# Patient Record
Sex: FEMALE | Race: WHITE | NOT HISPANIC OR LATINO | Employment: STUDENT | URBAN - METROPOLITAN AREA
[De-identification: names, ages, dates, MRNs, and addresses within clinical notes are randomized per-mention and may not be internally consistent; named-entity substitution may affect disease eponyms.]

---

## 2018-05-29 ENCOUNTER — TELEPHONE (OUTPATIENT)
Dept: OBSTETRICS AND GYNECOLOGY | Facility: CLINIC | Age: 23
End: 2018-05-29

## 2018-05-29 NOTE — TELEPHONE ENCOUNTER
I spoke with MA to see if when she called patient if she had complaints of decreased fetal movement, contractions, vaginal bleeding leakage of fluid. I was informed that the patient was fine and was scheduled to see Dr. Muñoz next week.     Khushi Vick MD, FACOG  OB/GYN  Pager: [phone]

## 2018-05-29 NOTE — TELEPHONE ENCOUNTER
----- Message from Maxine Heart sent at 5/29/2018  1:42 PM CDT -----  Contact: Self  [phone]  Patient is 7 month pregnant and she feels like she needs to be seen today due to she has not seen any doctor or had medical treatments. Please advice

## 2018-06-04 ENCOUNTER — PROCEDURE VISIT (OUTPATIENT)
Dept: OBSTETRICS AND GYNECOLOGY | Facility: CLINIC | Age: 23
End: 2018-06-04
Payer: MEDICAID

## 2018-06-04 ENCOUNTER — LAB VISIT (OUTPATIENT)
Dept: LAB | Facility: HOSPITAL | Age: 23
End: 2018-06-04
Payer: MEDICAID

## 2018-06-04 ENCOUNTER — OFFICE VISIT (OUTPATIENT)
Dept: OBSTETRICS AND GYNECOLOGY | Facility: CLINIC | Age: 23
End: 2018-06-04
Payer: MEDICAID

## 2018-06-04 VITALS
DIASTOLIC BLOOD PRESSURE: 70 MMHG | WEIGHT: 253.5 LBS | SYSTOLIC BLOOD PRESSURE: 130 MMHG | HEIGHT: 65 IN | BODY MASS INDEX: 42.24 KG/M2

## 2018-06-04 DIAGNOSIS — Z34.83 PRENATAL CARE, SUBSEQUENT PREGNANCY IN THIRD TRIMESTER: ICD-10-CM

## 2018-06-04 DIAGNOSIS — Z98.891 HISTORY OF C-SECTION: ICD-10-CM

## 2018-06-04 DIAGNOSIS — O09.33 NO PRENATAL CARE IN CURRENT PREGNANCY, THIRD TRIMESTER: ICD-10-CM

## 2018-06-04 DIAGNOSIS — Z34.83 PRENATAL CARE, SUBSEQUENT PREGNANCY IN THIRD TRIMESTER: Primary | ICD-10-CM

## 2018-06-04 DIAGNOSIS — O09.30 INSUFFICIENT PRENATAL CARE, UNSPECIFIED TRIMESTER: Primary | ICD-10-CM

## 2018-06-04 DIAGNOSIS — Z36.3 ANTENATAL SCREENING FOR MALFORMATION USING ULTRASONICS: ICD-10-CM

## 2018-06-04 LAB
ABO + RH BLD: NORMAL
BASOPHILS # BLD AUTO: 0.01 K/UL
BASOPHILS NFR BLD: 0.1 %
BLD GP AB SCN CELLS X3 SERPL QL: NORMAL
DIFFERENTIAL METHOD: ABNORMAL
EOSINOPHIL # BLD AUTO: 0.1 K/UL
EOSINOPHIL NFR BLD: 0.6 %
ERYTHROCYTE [DISTWIDTH] IN BLOOD BY AUTOMATED COUNT: 13.2 %
HCT VFR BLD AUTO: 38.1 %
HGB BLD-MCNC: 13.1 G/DL
LYMPHOCYTES # BLD AUTO: 2.3 K/UL
LYMPHOCYTES NFR BLD: 18.7 %
MCH RBC QN AUTO: 30 PG
MCHC RBC AUTO-ENTMCNC: 34.4 G/DL
MCV RBC AUTO: 87 FL
MONOCYTES # BLD AUTO: 0.6 K/UL
MONOCYTES NFR BLD: 4.9 %
NEUTROPHILS # BLD AUTO: 9.2 K/UL
NEUTROPHILS NFR BLD: 75.7 %
PLATELET # BLD AUTO: 324 K/UL
PMV BLD AUTO: 11.1 FL
RBC # BLD AUTO: 4.36 M/UL
WBC # BLD AUTO: 12.13 K/UL

## 2018-06-04 PROCEDURE — 86703 HIV-1/HIV-2 1 RESULT ANTBDY: CPT

## 2018-06-04 PROCEDURE — 36415 COLL VENOUS BLD VENIPUNCTURE: CPT

## 2018-06-04 PROCEDURE — 76819 FETAL BIOPHYS PROFIL W/O NST: CPT | Mod: PBBFAC,PO

## 2018-06-04 PROCEDURE — 87340 HEPATITIS B SURFACE AG IA: CPT

## 2018-06-04 PROCEDURE — 76805 OB US >/= 14 WKS SNGL FETUS: CPT | Mod: PBBFAC,PO

## 2018-06-04 PROCEDURE — 86762 RUBELLA ANTIBODY: CPT

## 2018-06-04 PROCEDURE — 86850 RBC ANTIBODY SCREEN: CPT

## 2018-06-04 PROCEDURE — 76805 OB US >/= 14 WKS SNGL FETUS: CPT | Mod: 26,S$PBB,, | Performed by: OBSTETRICS & GYNECOLOGY

## 2018-06-04 PROCEDURE — 99999 PR PBB SHADOW E&M-EST. PATIENT-LVL III: CPT | Mod: PBBFAC,,, | Performed by: OBSTETRICS & GYNECOLOGY

## 2018-06-04 PROCEDURE — 99213 OFFICE O/P EST LOW 20 MIN: CPT | Mod: PBBFAC,TH,PO | Performed by: OBSTETRICS & GYNECOLOGY

## 2018-06-04 PROCEDURE — 86592 SYPHILIS TEST NON-TREP QUAL: CPT

## 2018-06-04 PROCEDURE — 76819 FETAL BIOPHYS PROFIL W/O NST: CPT | Mod: 26,S$PBB,, | Performed by: OBSTETRICS & GYNECOLOGY

## 2018-06-04 PROCEDURE — 99203 OFFICE O/P NEW LOW 30 MIN: CPT | Mod: S$PBB,25,, | Performed by: OBSTETRICS & GYNECOLOGY

## 2018-06-04 PROCEDURE — 85025 COMPLETE CBC W/AUTO DIFF WBC: CPT

## 2018-06-04 NOTE — PROGRESS NOTES
Patient presents today with intrauterine pregnancy well advanced.  Her last menstrual period by history of 2017 and she states she had a positive pregnancy tests at home and 2018 by those dates she is due on 2018 and she is 37 weeks gestation.  Patient is a  3 para 2 with 2  sections in the past.  She has had no prenatal care during this pregnancy.  Ultrasound is scheduled and labs are scheduled as well.  Patient is given assistance to try to facilitate her Medicaid card as soon as possible.  Patient has not had any history of serious problems during the course of this unsupervised pregnancy.  Examination shows a fundal height of 37 cm.  Fetal heart tones are 120 in the right lower quadrant.  Pelvic exam shows a high presenting part with a closed long cervix.  It cannot be determined with certainty if this is a vertex presentation are not.  Consent forms were also obtained and patient was given labor precautions.  Patient was last seen in this office in .    U/S has  BPP but EDC of 2018 (probably erroneous due to HC and BPD measurements; OFD is in sync with dates and AC and FL.   Will repeat in 3 weeks. VTX Female.

## 2018-06-05 LAB
HBV SURFACE AG SERPL QL IA: NEGATIVE
HIV 1+2 AB+HIV1 P24 AG SERPL QL IA: NEGATIVE
RPR SER QL: NORMAL
RUBV IGG SER-ACNC: 22.2 IU/ML
RUBV IGG SER-IMP: REACTIVE

## 2018-06-11 ENCOUNTER — ROUTINE PRENATAL (OUTPATIENT)
Dept: OBSTETRICS AND GYNECOLOGY | Facility: CLINIC | Age: 23
End: 2018-06-11
Payer: MEDICAID

## 2018-06-11 VITALS — BODY MASS INDEX: 43.4 KG/M2 | WEIGHT: 260.81 LBS | DIASTOLIC BLOOD PRESSURE: 62 MMHG | SYSTOLIC BLOOD PRESSURE: 130 MMHG

## 2018-06-11 DIAGNOSIS — O09.33 NO PRENATAL CARE IN CURRENT PREGNANCY, THIRD TRIMESTER: Primary | ICD-10-CM

## 2018-06-11 DIAGNOSIS — Z98.891 HISTORY OF C-SECTION: ICD-10-CM

## 2018-06-11 DIAGNOSIS — Z34.83 PRENATAL CARE, SUBSEQUENT PREGNANCY IN THIRD TRIMESTER: ICD-10-CM

## 2018-06-11 PROCEDURE — 99999 PR PBB SHADOW E&M-EST. PATIENT-LVL III: CPT | Mod: PBBFAC,,, | Performed by: OBSTETRICS & GYNECOLOGY

## 2018-06-11 PROCEDURE — 99213 OFFICE O/P EST LOW 20 MIN: CPT | Mod: PBBFAC,TH,PO | Performed by: OBSTETRICS & GYNECOLOGY

## 2018-06-11 PROCEDURE — 99213 OFFICE O/P EST LOW 20 MIN: CPT | Mod: S$PBB,TH,, | Performed by: OBSTETRICS & GYNECOLOGY

## 2018-06-11 NOTE — PROGRESS NOTES
High VTX (?)---edc by single scan 1 week ago is 8/2/2018. Labs OK---no result for HgbA1c  RTO 2 weeks; Rpt u/s next visit---recheck measurements violetta splay for BPD-HC-OF

## 2018-06-25 ENCOUNTER — PROCEDURE VISIT (OUTPATIENT)
Dept: OBSTETRICS AND GYNECOLOGY | Facility: CLINIC | Age: 23
End: 2018-06-25
Payer: MEDICAID

## 2018-06-25 ENCOUNTER — ROUTINE PRENATAL (OUTPATIENT)
Dept: OBSTETRICS AND GYNECOLOGY | Facility: CLINIC | Age: 23
End: 2018-06-25
Payer: MEDICAID

## 2018-06-25 VITALS
SYSTOLIC BLOOD PRESSURE: 128 MMHG | DIASTOLIC BLOOD PRESSURE: 70 MMHG | WEIGHT: 257.25 LBS | BODY MASS INDEX: 42.81 KG/M2

## 2018-06-25 DIAGNOSIS — Z98.891 HISTORY OF C-SECTION: ICD-10-CM

## 2018-06-25 DIAGNOSIS — Z34.83 PRENATAL CARE, SUBSEQUENT PREGNANCY IN THIRD TRIMESTER: Primary | ICD-10-CM

## 2018-06-25 DIAGNOSIS — O36.60X0: ICD-10-CM

## 2018-06-25 DIAGNOSIS — Z34.83 PRENATAL CARE, SUBSEQUENT PREGNANCY IN THIRD TRIMESTER: ICD-10-CM

## 2018-06-25 DIAGNOSIS — O99.213 MATERNAL OBESITY, ANTEPARTUM, THIRD TRIMESTER: Primary | ICD-10-CM

## 2018-06-25 DIAGNOSIS — O09.33 NO PRENATAL CARE IN CURRENT PREGNANCY, THIRD TRIMESTER: ICD-10-CM

## 2018-06-25 DIAGNOSIS — O26.849 UTERINE SIZE DATE DISCREPANCY, ANTEPARTUM, UNSPECIFIED TRIMESTER: ICD-10-CM

## 2018-06-25 DIAGNOSIS — O09.33 INSUFFICIENT PRENATAL CARE IN THIRD TRIMESTER: ICD-10-CM

## 2018-06-25 PROCEDURE — 76819 FETAL BIOPHYS PROFIL W/O NST: CPT | Mod: PBBFAC,PO

## 2018-06-25 PROCEDURE — 99213 OFFICE O/P EST LOW 20 MIN: CPT | Mod: S$PBB,TH,25, | Performed by: OBSTETRICS & GYNECOLOGY

## 2018-06-25 PROCEDURE — 76816 OB US FOLLOW-UP PER FETUS: CPT | Mod: 26,S$PBB,, | Performed by: OBSTETRICS & GYNECOLOGY

## 2018-06-25 PROCEDURE — 99212 OFFICE O/P EST SF 10 MIN: CPT | Mod: PBBFAC,TH,PO | Performed by: OBSTETRICS & GYNECOLOGY

## 2018-06-25 PROCEDURE — 99999 PR PBB SHADOW E&M-EST. PATIENT-LVL II: CPT | Mod: PBBFAC,,, | Performed by: OBSTETRICS & GYNECOLOGY

## 2018-06-25 PROCEDURE — 76816 OB US FOLLOW-UP PER FETUS: CPT | Mod: PBBFAC,PO

## 2018-06-25 PROCEDURE — 76819 FETAL BIOPHYS PROFIL W/O NST: CPT | Mod: 26,S$PBB,, | Performed by: OBSTETRICS & GYNECOLOGY

## 2018-06-25 NOTE — PROGRESS NOTES
U/S: VTX Female; EDC 8/4/2018---Rpt C/S scheduled for 7/30/2018.  Labs reviewed---all OK; did not draw HgbA1c.   Fundus deviates to LUQ---poss fibroid?  RTO 2 weeks then weekly until delivery

## 2018-07-05 ENCOUNTER — ANESTHESIA EVENT (OUTPATIENT)
Dept: OBSTETRICS AND GYNECOLOGY | Facility: HOSPITAL | Age: 23
End: 2018-07-05
Payer: MEDICAID

## 2018-07-05 ENCOUNTER — TELEPHONE (OUTPATIENT)
Dept: OBSTETRICS AND GYNECOLOGY | Facility: CLINIC | Age: 23
End: 2018-07-05

## 2018-07-05 ENCOUNTER — HOSPITAL ENCOUNTER (INPATIENT)
Facility: HOSPITAL | Age: 23
LOS: 2 days | Discharge: HOME | End: 2018-07-07
Attending: OBSTETRICS & GYNECOLOGY | Admitting: OBSTETRICS & GYNECOLOGY
Payer: MEDICAID

## 2018-07-05 ENCOUNTER — ANESTHESIA (OUTPATIENT)
Dept: OBSTETRICS AND GYNECOLOGY | Facility: HOSPITAL | Age: 23
End: 2018-07-05
Payer: MEDICAID

## 2018-07-05 DIAGNOSIS — O60.03 PRETERM LABOR IN THIRD TRIMESTER: ICD-10-CM

## 2018-07-05 DIAGNOSIS — Z36.89 ENCOUNTER FOR TRIAGE IN PREGNANT PATIENT: ICD-10-CM

## 2018-07-05 LAB
ABO + RH BLD: NORMAL
BASOPHILS # BLD AUTO: 0.01 K/UL
BASOPHILS NFR BLD: 0.1 %
BLD GP AB SCN CELLS X3 SERPL QL: NORMAL
DIFFERENTIAL METHOD: ABNORMAL
EOSINOPHIL # BLD AUTO: 0 K/UL
EOSINOPHIL NFR BLD: 0 %
ERYTHROCYTE [DISTWIDTH] IN BLOOD BY AUTOMATED COUNT: 13.1 %
HCT VFR BLD AUTO: 36.3 %
HGB BLD-MCNC: 12.3 G/DL
LYMPHOCYTES # BLD AUTO: 1.3 K/UL
LYMPHOCYTES NFR BLD: 6.9 %
MCH RBC QN AUTO: 29.1 PG
MCHC RBC AUTO-ENTMCNC: 33.9 G/DL
MCV RBC AUTO: 86 FL
MONOCYTES # BLD AUTO: 0.6 K/UL
MONOCYTES NFR BLD: 3.4 %
NEUTROPHILS # BLD AUTO: 16.2 K/UL
NEUTROPHILS NFR BLD: 89.3 %
PLATELET # BLD AUTO: 248 K/UL
PMV BLD AUTO: 10.7 FL
RBC # BLD AUTO: 4.22 M/UL
WBC # BLD AUTO: 18.12 K/UL

## 2018-07-05 PROCEDURE — 37000008 HC ANESTHESIA 1ST 15 MINUTES: Performed by: OBSTETRICS & GYNECOLOGY

## 2018-07-05 PROCEDURE — 59514 CESAREAN DELIVERY ONLY: CPT | Mod: AT,,, | Performed by: OBSTETRICS & GYNECOLOGY

## 2018-07-05 PROCEDURE — 37000009 HC ANESTHESIA EA ADD 15 MINS: Performed by: OBSTETRICS & GYNECOLOGY

## 2018-07-05 PROCEDURE — 25000003 PHARM REV CODE 250: Performed by: OBSTETRICS & GYNECOLOGY

## 2018-07-05 PROCEDURE — 63600175 PHARM REV CODE 636 W HCPCS: Performed by: ANESTHESIOLOGY

## 2018-07-05 PROCEDURE — 86850 RBC ANTIBODY SCREEN: CPT

## 2018-07-05 PROCEDURE — 36415 COLL VENOUS BLD VENIPUNCTURE: CPT

## 2018-07-05 PROCEDURE — 25000003 PHARM REV CODE 250: Performed by: ANESTHESIOLOGY

## 2018-07-05 PROCEDURE — 27201108 HC SURGICEL

## 2018-07-05 PROCEDURE — S0028 INJECTION, FAMOTIDINE, 20 MG: HCPCS | Performed by: ANESTHESIOLOGY

## 2018-07-05 PROCEDURE — 36000681 HC C/S TUBAL LIGATION LEVEL II

## 2018-07-05 PROCEDURE — 27800516 HC TRAY, EPIDURAL COMBO: Performed by: ANESTHESIOLOGY

## 2018-07-05 PROCEDURE — 51702 INSERT TEMP BLADDER CATH: CPT

## 2018-07-05 PROCEDURE — 99211 OFF/OP EST MAY X REQ PHY/QHP: CPT | Mod: TH,25

## 2018-07-05 PROCEDURE — 63600175 PHARM REV CODE 636 W HCPCS: Performed by: OBSTETRICS & GYNECOLOGY

## 2018-07-05 PROCEDURE — 85025 COMPLETE CBC W/AUTO DIFF WBC: CPT

## 2018-07-05 PROCEDURE — 72100002 HC LABOR CARE, 1ST 8 HOURS

## 2018-07-05 PROCEDURE — 11000001 HC ACUTE MED/SURG PRIVATE ROOM

## 2018-07-05 PROCEDURE — 36000684 HC CESAREAN SECTION, UNSCHEDULED

## 2018-07-05 PROCEDURE — 27200710 HC EPIDURAL INFUSION PUMP SET: Performed by: ANESTHESIOLOGY

## 2018-07-05 RX ORDER — ONDANSETRON 2 MG/ML
4 INJECTION INTRAMUSCULAR; INTRAVENOUS EVERY 12 HOURS PRN
Status: DISCONTINUED | OUTPATIENT
Start: 2018-07-05 | End: 2018-07-05

## 2018-07-05 RX ORDER — KETOROLAC TROMETHAMINE 30 MG/ML
INJECTION, SOLUTION INTRAMUSCULAR; INTRAVENOUS
Status: DISCONTINUED | OUTPATIENT
Start: 2018-07-05 | End: 2018-07-05

## 2018-07-05 RX ORDER — HYDROCORTISONE 25 MG/G
CREAM TOPICAL 3 TIMES DAILY PRN
Status: DISCONTINUED | OUTPATIENT
Start: 2018-07-05 | End: 2018-07-05

## 2018-07-05 RX ORDER — OXYCODONE AND ACETAMINOPHEN 5; 325 MG/1; MG/1
1 TABLET ORAL EVERY 4 HOURS PRN
Status: DISCONTINUED | OUTPATIENT
Start: 2018-07-05 | End: 2018-07-07 | Stop reason: HOSPADM

## 2018-07-05 RX ORDER — SODIUM CHLORIDE, SODIUM LACTATE, POTASSIUM CHLORIDE, CALCIUM CHLORIDE 600; 310; 30; 20 MG/100ML; MG/100ML; MG/100ML; MG/100ML
INJECTION, SOLUTION INTRAVENOUS CONTINUOUS
Status: ACTIVE | OUTPATIENT
Start: 2018-07-05 | End: 2018-07-05

## 2018-07-05 RX ORDER — ACETAMINOPHEN 10 MG/ML
1000 INJECTION, SOLUTION INTRAVENOUS ONCE
Status: COMPLETED | OUTPATIENT
Start: 2018-07-05 | End: 2018-07-05

## 2018-07-05 RX ORDER — OXYTOCIN/RINGER'S LACTATE 20/1000 ML
41.65 PLASTIC BAG, INJECTION (ML) INTRAVENOUS CONTINUOUS
Status: ACTIVE | OUTPATIENT
Start: 2018-07-05 | End: 2018-07-05

## 2018-07-05 RX ORDER — DOCUSATE SODIUM 100 MG/1
200 CAPSULE, LIQUID FILLED ORAL 2 TIMES DAILY
Status: DISCONTINUED | OUTPATIENT
Start: 2018-07-05 | End: 2018-07-07 | Stop reason: HOSPADM

## 2018-07-05 RX ORDER — TERBUTALINE SULFATE 1 MG/ML
0.25 INJECTION SUBCUTANEOUS ONCE
Status: COMPLETED | OUTPATIENT
Start: 2018-07-05 | End: 2018-07-05

## 2018-07-05 RX ORDER — FAMOTIDINE 10 MG/ML
20 INJECTION INTRAVENOUS
Status: COMPLETED | OUTPATIENT
Start: 2018-07-05 | End: 2018-07-05

## 2018-07-05 RX ORDER — MUPIROCIN 20 MG/G
1 OINTMENT TOPICAL 2 TIMES DAILY
Status: DISCONTINUED | OUTPATIENT
Start: 2018-07-05 | End: 2018-07-07 | Stop reason: HOSPADM

## 2018-07-05 RX ORDER — SODIUM CHLORIDE, SODIUM LACTATE, POTASSIUM CHLORIDE, CALCIUM CHLORIDE 600; 310; 30; 20 MG/100ML; MG/100ML; MG/100ML; MG/100ML
INJECTION, SOLUTION INTRAVENOUS CONTINUOUS PRN
Status: DISCONTINUED | OUTPATIENT
Start: 2018-07-05 | End: 2018-07-05

## 2018-07-05 RX ORDER — PHENYLEPHRINE HYDROCHLORIDE 10 MG/ML
INJECTION INTRAVENOUS
Status: DISCONTINUED | OUTPATIENT
Start: 2018-07-05 | End: 2018-07-05

## 2018-07-05 RX ORDER — OXYCODONE AND ACETAMINOPHEN 10; 325 MG/1; MG/1
1 TABLET ORAL EVERY 4 HOURS PRN
Status: DISCONTINUED | OUTPATIENT
Start: 2018-07-05 | End: 2018-07-07 | Stop reason: HOSPADM

## 2018-07-05 RX ORDER — BETAMETHASONE SODIUM PHOSPHATE AND BETAMETHASONE ACETATE 3; 3 MG/ML; MG/ML
12 INJECTION, SUSPENSION INTRA-ARTICULAR; INTRALESIONAL; INTRAMUSCULAR; SOFT TISSUE ONCE
Status: COMPLETED | OUTPATIENT
Start: 2018-07-05 | End: 2018-07-05

## 2018-07-05 RX ORDER — KETOROLAC TROMETHAMINE 30 MG/ML
30 INJECTION, SOLUTION INTRAMUSCULAR; INTRAVENOUS ONCE
Status: DISCONTINUED | OUTPATIENT
Start: 2018-07-05 | End: 2018-07-07 | Stop reason: HOSPADM

## 2018-07-05 RX ORDER — KETOROLAC TROMETHAMINE 30 MG/ML
30 INJECTION, SOLUTION INTRAMUSCULAR; INTRAVENOUS EVERY 6 HOURS
Status: DISPENSED | OUTPATIENT
Start: 2018-07-05 | End: 2018-07-06

## 2018-07-05 RX ORDER — BETAMETHASONE SODIUM PHOSPHATE AND BETAMETHASONE ACETATE 3; 3 MG/ML; MG/ML
6 INJECTION, SUSPENSION INTRA-ARTICULAR; INTRALESIONAL; INTRAMUSCULAR; SOFT TISSUE ONCE
Status: DISCONTINUED | OUTPATIENT
Start: 2018-07-05 | End: 2018-07-05

## 2018-07-05 RX ORDER — SODIUM CHLORIDE, SODIUM LACTATE, POTASSIUM CHLORIDE, CALCIUM CHLORIDE 600; 310; 30; 20 MG/100ML; MG/100ML; MG/100ML; MG/100ML
INJECTION, SOLUTION INTRAVENOUS CONTINUOUS
Status: DISCONTINUED | OUTPATIENT
Start: 2018-07-05 | End: 2018-07-05

## 2018-07-05 RX ORDER — SODIUM CHLORIDE, SODIUM LACTATE, POTASSIUM CHLORIDE, CALCIUM CHLORIDE 600; 310; 30; 20 MG/100ML; MG/100ML; MG/100ML; MG/100ML
INJECTION, SOLUTION INTRAVENOUS
Status: DISCONTINUED | OUTPATIENT
Start: 2018-07-05 | End: 2018-07-05

## 2018-07-05 RX ORDER — ADHESIVE BANDAGE
30 BANDAGE TOPICAL 2 TIMES DAILY PRN
Status: DISCONTINUED | OUTPATIENT
Start: 2018-07-06 | End: 2018-07-07 | Stop reason: HOSPADM

## 2018-07-05 RX ORDER — IBUPROFEN 400 MG/1
800 TABLET ORAL EVERY 8 HOURS
Status: DISCONTINUED | OUTPATIENT
Start: 2018-07-06 | End: 2018-07-07 | Stop reason: HOSPADM

## 2018-07-05 RX ORDER — TERBUTALINE SULFATE 1 MG/ML
0.25 INJECTION SUBCUTANEOUS
Status: DISCONTINUED | OUTPATIENT
Start: 2018-07-05 | End: 2018-07-05

## 2018-07-05 RX ORDER — MUPIROCIN 20 MG/G
OINTMENT TOPICAL
Status: DISCONTINUED | OUTPATIENT
Start: 2018-07-05 | End: 2018-07-05

## 2018-07-05 RX ORDER — ONDANSETRON 8 MG/1
8 TABLET, ORALLY DISINTEGRATING ORAL EVERY 8 HOURS PRN
Status: DISCONTINUED | OUTPATIENT
Start: 2018-07-05 | End: 2018-07-07 | Stop reason: HOSPADM

## 2018-07-05 RX ORDER — AMOXICILLIN 250 MG
1 CAPSULE ORAL NIGHTLY PRN
Status: DISCONTINUED | OUTPATIENT
Start: 2018-07-05 | End: 2018-07-07 | Stop reason: HOSPADM

## 2018-07-05 RX ORDER — DIPHENHYDRAMINE HYDROCHLORIDE 50 MG/ML
12.5 INJECTION INTRAMUSCULAR; INTRAVENOUS
Status: DISCONTINUED | OUTPATIENT
Start: 2018-07-05 | End: 2018-07-07 | Stop reason: HOSPADM

## 2018-07-05 RX ORDER — ACETAMINOPHEN 500 MG
1000 TABLET ORAL ONCE
Status: DISCONTINUED | OUTPATIENT
Start: 2018-07-05 | End: 2018-07-07 | Stop reason: HOSPADM

## 2018-07-05 RX ORDER — MISOPROSTOL 200 UG/1
800 TABLET ORAL
Status: DISCONTINUED | OUTPATIENT
Start: 2018-07-05 | End: 2018-07-05

## 2018-07-05 RX ORDER — DIPHENHYDRAMINE HCL 25 MG
25 CAPSULE ORAL EVERY 4 HOURS PRN
Status: DISCONTINUED | OUTPATIENT
Start: 2018-07-05 | End: 2018-07-07 | Stop reason: HOSPADM

## 2018-07-05 RX ORDER — SIMETHICONE 80 MG
1 TABLET,CHEWABLE ORAL EVERY 6 HOURS PRN
Status: DISCONTINUED | OUTPATIENT
Start: 2018-07-05 | End: 2018-07-07 | Stop reason: HOSPADM

## 2018-07-05 RX ORDER — NALBUPHINE HYDROCHLORIDE 20 MG/ML
5 INJECTION, SOLUTION INTRAMUSCULAR; INTRAVENOUS; SUBCUTANEOUS EVERY 10 MIN PRN
Status: DISCONTINUED | OUTPATIENT
Start: 2018-07-05 | End: 2018-07-07 | Stop reason: HOSPADM

## 2018-07-05 RX ORDER — BISACODYL 10 MG
10 SUPPOSITORY, RECTAL RECTAL ONCE AS NEEDED
Status: ACTIVE | OUTPATIENT
Start: 2018-07-05 | End: 2018-07-05

## 2018-07-05 RX ORDER — SODIUM CITRATE AND CITRIC ACID MONOHYDRATE 334; 500 MG/5ML; MG/5ML
30 SOLUTION ORAL
Status: DISCONTINUED | OUTPATIENT
Start: 2018-07-05 | End: 2018-07-05

## 2018-07-05 RX ADMIN — DOCUSATE SODIUM 200 MG: 100 CAPSULE, LIQUID FILLED ORAL at 08:07

## 2018-07-05 RX ADMIN — DEXTROSE 3 G: 50 INJECTION, SOLUTION INTRAVENOUS at 05:07

## 2018-07-05 RX ADMIN — ACETAMINOPHEN 1000 MG: 10 INJECTION, SOLUTION INTRAVENOUS at 08:07

## 2018-07-05 RX ADMIN — PHENYLEPHRINE HYDROCHLORIDE 50 MCG: 10 INJECTION INTRAVENOUS at 05:07

## 2018-07-05 RX ADMIN — SODIUM CHLORIDE, SODIUM LACTATE, POTASSIUM CHLORIDE, AND CALCIUM CHLORIDE: .6; .31; .03; .02 INJECTION, SOLUTION INTRAVENOUS at 11:07

## 2018-07-05 RX ADMIN — TERBUTALINE SULFATE 0.25 MG: 1 INJECTION, SOLUTION SUBCUTANEOUS at 03:07

## 2018-07-05 RX ADMIN — PHENYLEPHRINE HYDROCHLORIDE 100 MCG: 10 INJECTION INTRAVENOUS at 05:07

## 2018-07-05 RX ADMIN — OXYCODONE HYDROCHLORIDE AND ACETAMINOPHEN 1 TABLET: 10; 325 TABLET ORAL at 10:07

## 2018-07-05 RX ADMIN — KETOROLAC TROMETHAMINE 30 MG: 30 INJECTION, SOLUTION INTRAMUSCULAR; INTRAVENOUS at 05:07

## 2018-07-05 RX ADMIN — PROMETHAZINE HYDROCHLORIDE 12.5 MG: 25 INJECTION INTRAMUSCULAR; INTRAVENOUS at 09:07

## 2018-07-05 RX ADMIN — MUPIROCIN 1 G: 20 OINTMENT TOPICAL at 09:07

## 2018-07-05 RX ADMIN — BETAMETHASONE SODIUM PHOSPHATE AND BETAMETHASONE ACETATE 12 MG: 3; 3 INJECTION, SUSPENSION INTRA-ARTICULAR; INTRALESIONAL; INTRAMUSCULAR at 03:07

## 2018-07-05 RX ADMIN — SODIUM CITRATE AND CITRIC ACID MONOHYDRATE 30 ML: 500; 334 SOLUTION ORAL at 05:07

## 2018-07-05 RX ADMIN — SODIUM CHLORIDE, SODIUM LACTATE, POTASSIUM CHLORIDE, AND CALCIUM CHLORIDE: .6; .31; .03; .02 INJECTION, SOLUTION INTRAVENOUS at 05:07

## 2018-07-05 RX ADMIN — FAMOTIDINE 20 MG: 10 INJECTION INTRAVENOUS at 05:07

## 2018-07-05 RX ADMIN — OXYCODONE HYDROCHLORIDE AND ACETAMINOPHEN 1 TABLET: 10; 325 TABLET ORAL at 06:07

## 2018-07-05 RX ADMIN — TERBUTALINE SULFATE 0.25 MG: 1 INJECTION, SOLUTION SUBCUTANEOUS at 04:07

## 2018-07-05 RX ADMIN — SODIUM CHLORIDE, SODIUM LACTATE, POTASSIUM CHLORIDE, AND CALCIUM CHLORIDE 1000 ML: .6; .31; .03; .02 INJECTION, SOLUTION INTRAVENOUS at 04:07

## 2018-07-05 RX ADMIN — PHENYLEPHRINE HYDROCHLORIDE 50 MCG: 10 INJECTION INTRAVENOUS at 06:07

## 2018-07-05 NOTE — L&D DELIVERY NOTE
Delivery Information for  Seven Victoria    Birth information:  YOB: 2017   Time of birth: 5:52 AM   Sex: female   Head Delivery Date/Time: 2018  5:52 AM   Delivery type: , Low Transverse   Gestational Age: 35w5d    Delivery Providers    Delivering clinician:  Khushi Vick MD   Provider Role    Aditi Clark Surgical Tech    Estrellita Mckeon RN Registered Nurse    Tamanna Maya, RN Registered Nurse    KEYLA Hill NP             [place] Measurements    Weight:  2530 g         [place] Assessment    Apgars:     1 Minute:   5 Minute:   10 Minute:   15 Minute:   20 Minute:     Skin Color:   1  1       Heart Rate:   2  2       Reflex Irritability:   2  2       Muscle Tone:   2  2       Respiratory Effort:   2  2       Total:   9  9               Apgars Assigned By:  TAMANNA MAYA         Assisted Delivery Details:    Forceps attempted?:  No  Vacuum extractor attempted?:  No         Shoulder Dystocia    Shoulder dystocia present?:  No           Presentation and Position    Presentation:  Vertex  Position:  Left Occiput Anterior           Interventions/Resuscitation    Method:  Bulb Suctioning, Deep Suctioning, Tactile Stimulation       Cord    Vessels:  3 vessels  Complications:  Nuchal  Nuchal Intervention:  reduced  Nuchal Cord Description:  loose nuchal cord  Number of Loops:  1  Delayed Cord Clamping?:  No  Cord Blood Disposition:  Sent with Baby  Gases Sent?:  Yes  Stem Cell Collection (by MD):  No       Placenta    Date and time:  2018  5:53 AM  Removal:  Manual removal  Appearance:  Intact  Placenta disposition:  discarded           Labor Events:       labor: Yes     Labor Onset Date/Time:         Dilation Complete Date/Time:         Start Pushing Date/Time:       Rupture Date/Time:              Rupture type: intact         Fluid Amount:        Fluid Color:        Fluid Odor:        Membrane Status (PeriCalm):        Rupture  Date/Time (PeriCalm):        Fluid Amount (PeriCalm):        Fluid Color (PeriCalm):         steroids: Partial Course     Antibiotics given for GBS: No     Induction: none     Indications for induction:        Augmentation:       Indications for augmentation:       Labor complications: Fetal Intolerance     Additional complications:          Cervical ripening:                     Delivery:      Episiotomy: None     Indication for Episiotomy:       Perineal Lacerations: None Repaired:      Periurethral Laceration: none Repaired:     Labial Laceration: none Repaired:     Sulcus Laceration: none Repaired:     Vaginal Laceration: No Repaired:     Cervical Laceration: No Repaired:     Repair suture:       Repair # of packets: 8     Vaginal delivery QBL (mL): 0      QBL (mL): 300     Combined Blood Loss (mL): 300     Vaginal Sweep Performed: Yes     Surgicount Correct: Yes       Other providers:       Anesthesia    Method:  Epidural, Spinal          Details (if applicable):  Trial of Labor No    Categorization: Repeat    Priority: Emergency   Indications for : Fetal Intolerance of Labor;Repeat Section   Incision Type: low transverse     Additional  information:  Forceps:    Vacuum:    Breech:    Observed anomalies    Other (Comments): Nuchal x 1  Thick Merconium       Khushi Vick MD, FACOG  OB/GYN  Pager: [phone]

## 2018-07-05 NOTE — H&P
[facility]  Obstetrics  History & Physical    Patient Name: Tanisha Victoria  MRN: [id number]  Admission Date: 2018  Primary Care Provider: Vincent Robertson Jr, MD    Subjective:     Principal Problem: labor in third trimester    History of Present Illness: Pt presents to triage with complaint of contractions since 7pm. Pregnancy complicated by late prenatal care dated by 32 week U/S, hx of C/D x 2. Pt denies leakage of fluid or decreased fetal movement. Upon arrival to triage at 3am pt willard every 5-7 min. SVE per nursing 2cm. FHT with late decels with spontaneous recovery. Pt given late  steroid dose x 1 and decision made to proceed with RLTCD. SVE on my arrival 3/80/-3.       Obstetric History       T2      L2     SAB0   TAB0   Ectopic0   Multiple0   Live Births2       # Outcome Date GA Lbr Jorge A/2nd Weight Sex Delivery Anes PTL Lv   3 Current            2 Term 04/06/15 39w3d  3.345 kg (7 lb 6 oz) F CS-LTranv   BIN   1 Term 10/30/13 38w5d  3.209 kg (7 lb 1.2 oz) F CS-LTranv Spinal N BIN      Name: VARGHESE,BABY GIRL      Apgar1:  9                Apgar5: 9        Past Medical History:   Diagnosis Date    Anxiety     Anxiety     Diabetes mellitus      Past Surgical History:   Procedure Laterality Date     SECTION      TENDON RELEASE      right 3rd finger    TONSILLECTOMY         PTA Medications   Medication Sig    PNV CMB#21/IRON/FOLIC ACID (PRENATAL COMPLETE ORAL) Take by mouth.       Review of patient's allergies indicates:  No Known Allergies     Family History     Problem Relation (Age of Onset)    Diabetes Maternal Grandfather    Hypertension Maternal Grandmother        Social History Main Topics    Smoking status: Never Smoker    Smokeless tobacco: Never Used    Alcohol use No    Drug use: No    Sexual activity: Yes     Partners: Male     Review of Systems   Constitutional: Negative.    HENT: Negative.    Eyes: Negative.    Respiratory:  Negative for cough and shortness of breath.    Cardiovascular: Negative for chest pain.   Gastrointestinal: Positive for abdominal pain. Negative for blood in stool, diarrhea, nausea and vomiting.   Endocrine: Negative.    Skin:  Negative.   Neurological: Negative for syncope, numbness and headaches.   Psychiatric/Behavioral: Negative.       Objective:     Vital Signs (Most Recent):    Vital Signs (24h Range):        Weight: 114.8 kg (253 lb)  Body mass index is 42.1 kg/m².    FHT: 145, mod BTBV, +accels, +late decels now to 90s x 1 min with spontaneous return to baseline Cat 2  TOCO:  Q 5 minutes    Physical Exam:   Constitutional: She is oriented to person, place, and time. She appears well-developed and well-nourished. No distress.    HENT:   Head: Normocephalic and atraumatic.      Cardiovascular: Normal rate, regular rhythm and normal heart sounds.     Pulmonary/Chest: Effort normal and breath sounds normal. She has no wheezes. She has no rales.        Abdominal: Soft. Bowel sounds are normal.   nontender uterus     Genitourinary:   Genitourinary Comments: + dark blood with my second SVE at 5a             Musculoskeletal: Moves all extremeties. She exhibits no edema.       Neurological: She is alert and oriented to person, place, and time. She has normal reflexes.    Skin: Skin is warm and dry.    Psychiatric: She has a normal mood and affect.     Significant Labs:  Lab Results   Component Value Date    GROUPTRH AB POS 2018    HEPBSAG Negative 2018    STREPBCULT No Group B Streptococcus isolated 10/14/2013       CBC:   Recent Labs  Lab 18  0435   WBC 18.12*   RBC 4.22   HGB 12.3   HCT 36.3*      MCV 86   MCH 29.1   MCHC 33.9       Assessment/Plan:     23 y.o. female  at 35w5d for:    Active Diagnoses:    Diagnosis Date Noted POA    PRINCIPAL PROBLEM:   labor in third trimester [O60.03] 2018 Unknown    Encounter for triage in pregnant patient [Z36.89] 2018 Not  Applicable    History of  [Z98.891] 2018 Not Applicable      Problems Resolved During this Admission:    Diagnosis Date Noted Date Resolved POA       1. Proceed to OR for RLTCD  -Anesthesia at bedside  -Ancef  -CBC/T&S reviewed    Khushi Vick MD  Obstetrics  [facility]

## 2018-07-05 NOTE — NURSING
Patient presents to LD c/o contractions since 1400. SVE 2/60/-2, patient states ctx's 9/10. Dr Vick notified

## 2018-07-05 NOTE — PROGRESS NOTES
Report received from LEON Spears RN at this time. Beside assessment completed. Patient oriented to room and made comfortable. No acute distress noted at this time. Will continue to monitor.

## 2018-07-05 NOTE — ANESTHESIA PREPROCEDURE EVALUATION
2018  Tanisha Victoria is a 23 y.o., female full-term preg; hx C/S  X 2; now in labor w some fetal HR instability (¿early abruption?) => for repeat C/S.    PRIOR ANES (in Epic) 2018 C/S Spinal    bupivacaine 0.75% w dextrose 1.4ml    NAAC   C/S Spinal    bupivacaine 0.75% w dextrose 1.6ml    NAAC    ANES-RELATED MED/SURG  Patient Active Problem List   Diagnosis    Insufficient prenatal care in third trimester    History of     Prenatal care, subsequent pregnancy in third trimester    Encounter for triage in pregnant patient     Past Medical History:   Diagnosis Date    Anxiety     Anxiety     Diabetes mellitus      Past Surgical History:   Procedure Laterality Date     SECTION      TENDON RELEASE      right 3rd finger    TONSILLECTOMY       ALLERGIES  Review of patient's allergies indicates:  No Known Allergies    ANES-RELATED HOME Rx]      Pre-op Assessment         Review of Systems  Anesthesia Hx:  No previous Anesthesia  Neg history of prior surgery. Personal Hx of Anesthesia complications (pt thinks she had code blue with prev c/s but no sign of this in epic; vomited X 4 w c/s 2018)   Social:  Non-Smoker    Hematology/Oncology:  Hematology Normal        Cardiovascular:   Hypertension (controlled and not on any meds per pt)    Pulmonary:  Pulmonary Normal    Renal/:  Renal/ Normal     Hepatic/GI:   GERD    Psych:   Psychiatric History (Severe anxiety attacks that look like seizures, but not had any problems for a long time)        Wt Readings from Last 1 Encounters:   18 114.8 kg (253 lb)     Temp Readings from Last 1 Encounters:   04/08/15 36.9 °C (98.4 °F) (Oral)     BP Readings from Last 1 Encounters:   18 128/70     Pulse Readings from Last 1 Encounters:   04/08/15 91     SpO2 Readings from Last 1 Encounters:   04/07/15 99%        Physical Exam  General:  Morbid Obesity    Airway/Jaw/Neck:  Airway Findings: Mouth Opening: Small, but > 3cm Tongue: Normal  General Airway Assessment: Adult  Mallampati: III  TM Distance: 4-6 cm  Jaw/Neck Findings:  Neck ROM: Normal ROM  Neck Findings:  Girth Increased      Dental:  Dental Findings: In tact   Chest/Lungs:  Chest/Lungs Findings: Clear to auscultation, Normal Respiratory Rate     Heart/Vascular:  Heart Findings: Rate: Normal  Rhythm: Regular Rhythm  Sounds: Normal           Lab Results   Component Value Date    WBC 18.12 (H) 07/05/2018    HGB 12.3 07/05/2018    HCT 36.3 (L) 07/05/2018    MCV 86 07/05/2018     07/05/2018         CXR      EKG      ECHO      Anesthesia Plan  Type of Anesthesia, risks & benefits discussed:  Anesthesia Type:  spinal, CSE, general  Patient's Preference:   Intra-op Monitoring Plan:   Intra-op Monitoring Plan Comments:   Post Op Pain Control Plan:   Post Op Pain Control Plan Comments:   Induction:    Beta Blocker:         Informed Consent: Patient understands risks and agrees with Anesthesia plan.  Questions answered. Anesthesia consent signed with patient.  ASA Score: 3     Day of Surgery Review of History & Physical: I have interviewed and examined the patient. I have reviewed the patient's H&P dated:        Anesthesia Plan Notes: Labs pending IV placement        Ready For Surgery From Anesthesia Perspective.

## 2018-07-05 NOTE — PLAN OF CARE
Problem: Patient Care Overview  Goal: Plan of Care Review  23y.o  at 35 weeks 5/7 days. Patient presented to [facility] c/o contractions since 1400 2018. Patient reports positive fetal movement, denies any LOF, VB. Patient reports 8-10 for pain, brethine, BMZ and IV fluids administered as per Dr Vick. SVE /-2. Dr Vick at bedside, SVE 3-60--2, decision to proceed with C/S due to fetal intolerance. Viable female delivered via C/S, thick merconium and Nuchal x1 > Patient tolerated c/section transferred back to triage 2 for recovery.

## 2018-07-05 NOTE — OP NOTE
Section Operative Note    Date of Procedure: 2018    Procedure: repeat  section, low transverse incision  section via pfannenstiel skin incision     Indications:  labor, history of  delivery x 2    Pre-operative Diagnosis:   1. IUP at 35 week 5 day pregnancy  2.  labor  3. History of  delivery x 2  4. Morbid obesity    Post-operative Diagnosis: same, meconium stained fluid and placenta, nuchal cord x 1    Surgeon: Khushi Vick MD    Assistants: Romi Alex    Anesthesia: Spinal anesthesia    Findings: viable female infant, apgars 9/9. Normal uterus outline    Estimated Blood Loss:  300 mLml           Total IV Fluids: 2000 ml    Urine Output: 165 ml     Specimens: placenta                  Complications:  None; patient tolerated the procedure well.               Condition: stable    Procedure Details   The risks, benefits, complications, treatment options, and expected outcomes were discussed with the patient.  The patient concurred with the proposed plan, giving informed consent. The patient was taken to Operating Room and a time out was held. Pt received 3g of Ancef    After induction of anesthesia, the patient was prepped and draped in the usual sterile manner. A Pfannenstiel incision was made and carried down through the subcutaneous tissue to the fascia. Fascial incision was made and extended transversely. The fascia was grasped with Kocher clamps and  from the underlying rectus tissue superiorly and inferiorly. The peritoneum was identified, found to be free of adherent bowel and entered sharply. Peritoneal incision was extended longitudinally. The vesico-uterine peritoneum was identified and bladder blade was inserted. The vesico-uterine peritoneum was incised transversely and the bladder flap was bluntly freed from the lower uterine segment. The bladder blade was reinserted to keep the bladder out of the operative field. A low  transverse uterine incision was made with knife. Infant was noted to be in vertex position. The head was brought to the incision and elevated out of the pelvis. The patient delivered a single viable female infant without difficulty.  Infant with Apgar scores of 9/9 at one and five minutes respectively. After the umbilical cord was clamped and cut cord blood was obtained for evaluation. The placenta was removed intact and appeared normal. The uterus was not exteriorized. The uterine outline appeared normal. The uterine incision was closed with running locked sutures of 0 chromic. Hemostasis was observed. Surgicel placed under bladder flap for additional hemostasis. Incision was reinspected and good hemostasis was noted. The abdominal cavity was irrigated to remove clots. The peritoneum was reapproximated with 2-0 Vicryl running. The muscle was reapproximated with 0 chromic gut in mattress fashion. The fascia was then reapproximated with running sutures of 0 Vicryl x 2. The subcutaneous tissue irrigated and reapproximated with 2-0 plain gut. The skin was reapproximated with 4-0 Monocryl. Instrument, sponge, and needle counts were correct prior the abdominal closure and at the conclusion of the case.     Khushi Vick MD  OB/GYN  Pager: [phone]

## 2018-07-05 NOTE — PLAN OF CARE
Problem: Patient Care Overview  Goal: Plan of Care Review  Outcome: Ongoing (interventions implemented as appropriate)  POC reviewed with patient. Pt tolerating regular diet, will remain on bedrest until the AM, and [place] will remain in place until 7/5 0600. VS remained stable. Afebrile. Pain being well controlled with ordered medications. No acute distress noted. Will continue to monitor.

## 2018-07-05 NOTE — PROGRESS NOTES
"PPD "0":    Recovering from Repeat C/S in triage area ---presented 1 month early in active labor. Viable 5#9 female infant. Dr Vick did delivery.  Anticipate steady recovery and discharge home 7/8/2018.  "

## 2018-07-05 NOTE — TELEPHONE ENCOUNTER
Left detailed message for patient in regards to appt on Monday. Due to patient delivering today she does not need to come in on Monday. Physician would like to see her on Thursday or Friday for pp check up/bandage removal.

## 2018-07-05 NOTE — TRANSFER OF CARE
"Anesthesia Transfer of Care Note    Patient: Tanisha Victoria    Procedure(s) Performed: Procedure(s) (LRB):   SECTION (N/A)    Patient location: Labor and Delivery    Transport from OR: Transported from OR on room air with adequate spontaneous ventilation    Post pain: adequate analgesia    Post assessment: no apparent anesthetic complications    Post vital signs: stable    Level of consciousness: alert and oriented    Nausea/Vomiting: no nausea/vomiting    Complications: none    Transfer of care protocol was followed      Last vitals:   Visit Vitals  Ht 5' 5" (1.651 m)   Wt 114.8 kg (253 lb)   LMP 2017 (Exact Date)   BMI 42.10 kg/m²     "

## 2018-07-05 NOTE — ANESTHESIA PROCEDURE NOTES
CSE    Patient location during procedure: OR  Start time: 7/5/2018 5:30 AM  Timeout: 7/5/2018 5:30 AM  Reason for block: at surgeon's request and post-op pain management  Staffing  Anesthesiologist: ANTONELLA SERRANO  Performed: anesthesiologist   Preanesthetic Checklist  Completed: patient identified, pre-op evaluation, timeout performed, IV checked, risks and benefits discussed and monitors and equipment checked  CSE  Patient position: sitting  Prep: Betadine and ChloraPrep  Patient monitoring: heart rate and frequent blood pressure checks  Approach: midline  Spinal Needle  Needle type: pencil-tip   Needle gauge: 25 G  Needle length: 5 in  Epidural Needle  Injection technique: TY saline (flush-advance)  Needle type: Tuohy   Needle gauge: 17 G  Needle length: 3.5 in  Location: L3-4  Needle localization: anatomical landmarks  Catheter  Catheter type: springwound (side holes; 1st @ 1 cm from tip)  Catheter size: 20 G  Test dose: lidocaine 1.5% with Epi 1-to-200,000  Additional Documentation: negative aspiration for heme and negative test dose  Assessment  Intrathecal Medications:  Bolus administered: 2.5 mL of 0.5 bupivacaine  administered: primary anesthetic and 200 mcg of  fentanyl and morphine (10)  Additional Notes  CSE for C/S  [id number] <-- Date   23 <-- Pt age   1.7 <-- Pt ht (m)  115 <- - Pt wt (kg)  staf <-- Resident level (or insertion by staff)  90 <-- Angle of needle insertion (degrees)   4 <-- SSL depth (cm lucila on Tuohy when increased resistance to aqueous solution flush):  8.5 <-- TY depth (cm nark on Tuohy when loss of resistance to aqueous solution flush):   3 <-- Number of distinct ligamentous strata felt during flush-advance (2 or 3):   0 <-- Number of times spinal needle used to test for possible TY (before final):   1 <--Number of 2-mm Tuohy advances needed before catheter would thread into epidural space  0 <--¿Need to turn Tuohy 90° to return CSF?    Number cm cath in epidural space:  5

## 2018-07-06 LAB
BASOPHILS # BLD AUTO: 0.01 K/UL
BASOPHILS NFR BLD: 0.1 %
DIFFERENTIAL METHOD: ABNORMAL
EOSINOPHIL # BLD AUTO: 0 K/UL
EOSINOPHIL NFR BLD: 0.1 %
ERYTHROCYTE [DISTWIDTH] IN BLOOD BY AUTOMATED COUNT: 13.1 %
HCT VFR BLD AUTO: 29 %
HGB BLD-MCNC: 9.9 G/DL
LYMPHOCYTES # BLD AUTO: 1.5 K/UL
LYMPHOCYTES NFR BLD: 8.8 %
MCH RBC QN AUTO: 29.4 PG
MCHC RBC AUTO-ENTMCNC: 34.1 G/DL
MCV RBC AUTO: 86 FL
MONOCYTES # BLD AUTO: 1.3 K/UL
MONOCYTES NFR BLD: 7.5 %
NEUTROPHILS # BLD AUTO: 14.4 K/UL
NEUTROPHILS NFR BLD: 83.2 %
PLATELET # BLD AUTO: 209 K/UL
PMV BLD AUTO: 10.9 FL
RBC # BLD AUTO: 3.37 M/UL
WBC # BLD AUTO: 17.33 K/UL

## 2018-07-06 PROCEDURE — 99231 SBSQ HOSP IP/OBS SF/LOW 25: CPT | Mod: ,,, | Performed by: OBSTETRICS & GYNECOLOGY

## 2018-07-06 PROCEDURE — 85025 COMPLETE CBC W/AUTO DIFF WBC: CPT

## 2018-07-06 PROCEDURE — 11000001 HC ACUTE MED/SURG PRIVATE ROOM

## 2018-07-06 PROCEDURE — 25000003 PHARM REV CODE 250: Performed by: OBSTETRICS & GYNECOLOGY

## 2018-07-06 PROCEDURE — 36415 COLL VENOUS BLD VENIPUNCTURE: CPT

## 2018-07-06 PROCEDURE — 63600175 PHARM REV CODE 636 W HCPCS: Performed by: OBSTETRICS & GYNECOLOGY

## 2018-07-06 RX ADMIN — MUPIROCIN 1 G: 20 OINTMENT TOPICAL at 08:07

## 2018-07-06 RX ADMIN — SIMETHICONE 80 MG: 80 TABLET, CHEWABLE ORAL at 07:07

## 2018-07-06 RX ADMIN — DOCUSATE SODIUM 200 MG: 100 CAPSULE, LIQUID FILLED ORAL at 08:07

## 2018-07-06 RX ADMIN — IBUPROFEN 800 MG: 400 TABLET, FILM COATED ORAL at 02:07

## 2018-07-06 RX ADMIN — KETOROLAC TROMETHAMINE 30 MG: 30 INJECTION, SOLUTION INTRAMUSCULAR at 06:07

## 2018-07-06 RX ADMIN — OXYCODONE HYDROCHLORIDE AND ACETAMINOPHEN 1 TABLET: 10; 325 TABLET ORAL at 11:07

## 2018-07-06 RX ADMIN — IBUPROFEN 800 MG: 400 TABLET, FILM COATED ORAL at 09:07

## 2018-07-06 RX ADMIN — OXYCODONE HYDROCHLORIDE AND ACETAMINOPHEN 1 TABLET: 10; 325 TABLET ORAL at 02:07

## 2018-07-06 RX ADMIN — OXYCODONE HYDROCHLORIDE AND ACETAMINOPHEN 1 TABLET: 10; 325 TABLET ORAL at 08:07

## 2018-07-06 RX ADMIN — KETOROLAC TROMETHAMINE 30 MG: 30 INJECTION, SOLUTION INTRAMUSCULAR at 12:07

## 2018-07-06 RX ADMIN — OXYCODONE HYDROCHLORIDE AND ACETAMINOPHEN 1 TABLET: 10; 325 TABLET ORAL at 06:07

## 2018-07-06 RX ADMIN — OXYCODONE HYDROCHLORIDE AND ACETAMINOPHEN 1 TABLET: 10; 325 TABLET ORAL at 04:07

## 2018-07-06 NOTE — PLAN OF CARE
Problem: Patient Care Overview  Goal: Individualization & Mutuality  Plan of care reviewed with pt.  VSS. Pt voiding and passing flatus without difficulty.  Ambulating well.  Tolerating regular diet.  Reports pain relieved with ordered pain meds administered.   Questions encouraged and answered.  Bonding appropriately with infant.  Encouraged to feed infant 8 or more times in 24 hour period and on demand feedings.  Verbalized understanding. Paced bottle feedings encouraged; on cue feedings promoted.   Mother will continue to observe for on cue feedings for infant.

## 2018-07-06 NOTE — PLAN OF CARE
Patient questioned if diagnosed with diabetes. Patient stated she was only diagnosed with gestational diabetes with a previous pregnancy.

## 2018-07-06 NOTE — PHYSICIAN QUERY
PT Name: Tanisha Victoria  MR #: [id number]     Physician Query Form - Documentation Clarification      CDS/: DAVID Deal,[email or web address]          Contact information:[email or web address]    This form is a permanent document in the medical record.     Query Date: 2018    By submitting this query, we are merely seeking further clarification of documentation. Please utilize your independent clinical judgment when addressing the question(s) below.    The Medical record reflects the following:    Supporting Clinical Findings Location in Medical Record   Cardiovascular:   Hypertension (controlled and not on any meds per pt)     Procedure: repeat  section, low transverse incision  section via pfannenstiel skin incision     JG=163/70, 147/69, 140/64 Anesthesia note       Op note       Flowsheet -                                                                                      Doctor, Please specify type of hypertension complicating childbirth.    Provider Use Only      [  ] Pre-existing hypertension  [  ] Gestational hypertension  [  ] Other, please specify:______________________________________                                                                                                                   [ x ] Clinically undetermined

## 2018-07-06 NOTE — PROGRESS NOTES
"POD #1 s/p     Subjective: No complaints. Doing well. Tolerating PO intake and ambulating well. Has not had a bowel movement yet.     Objective: /74 (BP Location: Right arm, Patient Position: Sitting)   Pulse 76   Temp 98.7 °F (37.1 °C) (Oral)   Resp 18   Ht 5' 5" (1.651 m)   Wt 114.8 kg (253 lb)   LMP 2017 (Exact Date)   SpO2 99%   Breastfeeding? Unknown   BMI 42.10 kg/m²     I/O last 3 completed shifts:  In:  [I.V.:]  Out:  [Urine:1655; Other:60; Blood:300]  No intake/output data recorded.        H/H:   Lab Results   Component Value Date    WBC 17.33 (H) 2018    HGB 9.9 (L) 2018    HCT 29.0 (L) 2018    MCV 86 2018     2018       Chest: Clear to auscultation  CV: Regular rate and rhythm  Abdomen: Non-tender, non-distended, soft, positive bowel sounds  Incision: Clean, dry, intact  Extremities: Non-tender, no edema    Assessment:POD #1 S/P     Plan: Routine progressive care. She can have a stool softener to have a bowel movement.   "

## 2018-07-06 NOTE — PLAN OF CARE
0750am=  Awake, in bed, resp even and unlabored.  IV Lt hand infusing LR at 125 ml/hr, via pump, without difficulty.  Pt with no urge to void at present.  Instructed pt to call for assistance if needs to get OOB.  Verbalized understanding.  Reports pain subsiding with pain med administration.  Voices no further complaints at present.  Resting quietly with no distress.  Safety maintained with call light in reach.  1100am=  Pt with no urge to void at present.  Encouraged pt to get OOB and this nurse will assist her to bathroom to attempt to void.  Assisted pt to bathroom.  Ambulated without difficulty.  Pt voided 300 ml of urine.  Isabel area cleaned.  Assisted pt  Back to bed.  IVF's discontinued and pt SL.  Flushed SL with 5 ml NS.  Voices no complaints at present.  Resting quietly with no distress.  Safety maintained with call light in reach.

## 2018-07-06 NOTE — ANESTHESIA POSTPROCEDURE EVALUATION
"Anesthesia Post Evaluation    Patient: Tanisha Victoria    Procedure(s) Performed: Procedure(s) (LRB):   SECTION (N/A)    Final Anesthesia Type: CSE  Patient location during evaluation: labor & delivery  Patient participation: Yes- Able to Participate  Level of consciousness: awake and alert and oriented  Post-procedure vital signs: reviewed and stable  Pain management: adequate  Airway patency: patent  PONV status at discharge: No PONV  Anesthetic complications: no      Cardiovascular status: blood pressure returned to baseline and hemodynamically stable  Respiratory status: unassisted, spontaneous ventilation and room air  Hydration status: euvolemic  Follow-up not needed.        Visit Vitals  /74 (BP Location: Right arm, Patient Position: Sitting)   Pulse 76   Temp 37.1 °C (98.7 °F) (Oral)   Resp 18   Ht 5' 5" (1.651 m)   Wt 114.8 kg (253 lb)   LMP 2017 (Exact Date)   SpO2 99%   Breastfeeding? Unknown   BMI 42.10 kg/m²       Pain/Radha Score: Pain Rating Prior to Med Admin: 7 (2018  6:10 AM)  Pain Rating Post Med Admin: 0 (2018  3:00 AM)    No catheter in back  No headache/neckache/backache  Full return of neurological function  Roberson catheter removed at 6am, patient has still not urinated. Requested she contact anesthesia should she have any difficulties.   Advised patient to report any new problems of back pain, especially with fever or decreasing bladder function occurring during coming days to weeks      "

## 2018-07-06 NOTE — PHYSICIAN QUERY
PT Name: Tanisha Victoria  MR #: [id number]     Physician Query Form - Documentation Clarification      CDS/: DAVID Deal,RNC-MNN           Contact information:[email or web address]    This form is a permanent document in the medical record.     Query Date: 2018    By submitting this query, we are merely seeking further clarification of documentation. Please utilize your independent clinical judgment when addressing the question(s) below.    The Medical record reflects the following:    Supporting Clinical Findings Location in Medical Record   Past Medical History:  Diabetes mellitus    Procedure: repeat  section, low transverse incision  section via pfannenstiel skin incision   H&P       Op note                                                                                       Doctor, Please specify type of Diabetes Mellitus complicating childbirth.    Provider Use Only      [  ] Past medical history only of Diabetes Mellitus  [  ] Pre-existing Type 2 Diabetes Mellitus  [  ] Gestational Diabetes Mellitus, please specify control type:_________________  [  ] Other, please specify:____________________________________________                                                                                                                 [ x ] Clinically undetermined

## 2018-07-07 VITALS
BODY MASS INDEX: 42.15 KG/M2 | OXYGEN SATURATION: 100 % | DIASTOLIC BLOOD PRESSURE: 88 MMHG | HEIGHT: 65 IN | SYSTOLIC BLOOD PRESSURE: 134 MMHG | TEMPERATURE: 98 F | WEIGHT: 253 LBS | HEART RATE: 73 BPM | RESPIRATION RATE: 18 BRPM

## 2018-07-07 PROCEDURE — 25000003 PHARM REV CODE 250: Performed by: OBSTETRICS & GYNECOLOGY

## 2018-07-07 PROCEDURE — 99238 HOSP IP/OBS DSCHRG MGMT 30/<: CPT | Mod: ,,, | Performed by: OBSTETRICS & GYNECOLOGY

## 2018-07-07 PROCEDURE — 90715 TDAP VACCINE 7 YRS/> IM: CPT | Performed by: OBSTETRICS & GYNECOLOGY

## 2018-07-07 PROCEDURE — 90471 IMMUNIZATION ADMIN: CPT | Performed by: OBSTETRICS & GYNECOLOGY

## 2018-07-07 PROCEDURE — 63600175 PHARM REV CODE 636 W HCPCS: Performed by: OBSTETRICS & GYNECOLOGY

## 2018-07-07 RX ADMIN — OXYCODONE HYDROCHLORIDE AND ACETAMINOPHEN 1 TABLET: 10; 325 TABLET ORAL at 07:07

## 2018-07-07 RX ADMIN — CLOSTRIDIUM TETANI TOXOID ANTIGEN (FORMALDEHYDE INACTIVATED), CORYNEBACTERIUM DIPHTHERIAE TOXOID ANTIGEN (FORMALDEHYDE INACTIVATED), BORDETELLA PERTUSSIS TOXOID ANTIGEN (GLUTARALDEHYDE INACTIVATED), BORDETELLA PERTUSSIS FILAMENTOUS HEMAGGLUTININ ANTIGEN (FORMALDEHYDE INACTIVATED), BORDETELLA PERTUSSIS PERTACTIN ANTIGEN, AND BORDETELLA PERTUSSIS FIMBRIAE 2/3 ANTIGEN 0.5 ML: 5; 2; 2.5; 5; 3; 5 INJECTION, SUSPENSION INTRAMUSCULAR at 10:07

## 2018-07-07 RX ADMIN — IBUPROFEN 800 MG: 400 TABLET, FILM COATED ORAL at 05:07

## 2018-07-07 RX ADMIN — OXYCODONE HYDROCHLORIDE AND ACETAMINOPHEN 1 TABLET: 10; 325 TABLET ORAL at 01:07

## 2018-07-07 RX ADMIN — DOCUSATE SODIUM 200 MG: 100 CAPSULE, LIQUID FILLED ORAL at 08:07

## 2018-07-07 RX ADMIN — MUPIROCIN 1 G: 20 OINTMENT TOPICAL at 08:07

## 2018-07-07 NOTE — DISCHARGE INSTRUCTIONS
"Patient Discharge Instructions for Postpartum Women    Resume Regular Diet  Increase activity gradually, no heavy lifting  Shower  No tampons, douching or sexual intercourse.  Discuss birth control options with your physician.  Wear a support bra  Return to work/school when you've been cleared by a physician    Call your physician if     *Fever of 100.4 or higher  *Persistent nausea/ vomiting  *Incisional drainage  *Heavy vaginal bleeding or large clots (Heavy bleeding is soaking 1 pad in an hour)  *Swelling and pain in arms or legs  *Severe headaches, blurred vision or fainting  *Shortness of breath  *Frequency and burning with urination  *Signs of postpartum depression, discuss these signs with your physician    Call lactation services for questions regarding feeding, nipple and breast care, and general questions about lactation.  They can be reached at [phone]         Understanding Postpartum Depression    You've just had a baby.  You know you should be excited and happy.  But instead you find yourself crying for no reason.  You may have trouble coping with your daily tasks.  You feel sad, tired, and hopeless most of the time.  You may even feel ashamed or guilty.  But what you're going through is not your fault and you can feel better.  Talk to your doctor.  He or she can help.    Depression After Childbirth    You may be weepy and tired right after giving birth.  These feelings are normal.  They're sometimes called the "baby blues."  These blues go away 2-3 weeks.  However, postpartum (meaning "after birth") depression lasts much longer and is more sever than the "baby blues."  It can make you feel sad and hopeless.  You may also fear that your baby will be harmed and worry about being a bad mother.      What is Depression?    Depression is a mood disorder that affects the way you think and feel.  The most common symptom is a feeling of deep sadness.  You may also feel as if you just can't cope with life.  "   Other symptoms include:      * Gaining or loosing weight  * Sleeping too much or too little  * Feeling tired all the time  * Feeling restless  * Fears of harming your baby   * Lack of interest in your baby  * Feeling worthless or guilty  * No longer finding pleasure in things you used to  * Having trouble thinking clearly or making decisions  * Thoughts of hurting yourself or your baby    What Causes Postpartum Depression    The exact causes of postpartum depression isn't known.  It may be due to changes in your hormones during and after childbirth.  You may also be tired from caring for your baby and adjusting to being a mother.  All these factors may make you feel depressed.  In some cases, your genes may also play a role.    Depression Can Be Treated    The good news is that there are many ways to treat postpartum depression.  Talking to your doctor is the first step toward feeling better.    Resources:    * National [place] of Mental Health  -- 718.618.9380    www.nimh.nih.gov    * National [place] on Mental Illness --173.442.9277    Www.dani.org    * Mental Health [place] -- 573.409.2125     Www.[facility].org    * National Suicide Hotline --336.886.1109 (800-SUICIDE)    6455-8870 [employer]  All rights reserved.  This information is not intended as a substitute for professional medical care.  Always follow up with your healthcare professional's instructions.      Management of Engorgement in the Non-Nursing Mother    Your breastmilk will usually come in within 3-5 days after delivery even if you have decided not to breastfeed.  Your breasts may become full, lumpy, hard and uncomfortable due to the glands filling with milk and swelling of the breast tissue, blood vessels, and lymph glands.  This is a temporary condition usually lasting 24-48 hrs.  If your breasts become uncomfortable during this time, you may use some or all of the comfort measures described below to obtain relief.      Measures  to relieve discomfort:    1. Wear a well fitting supportive bra. It should not be too tight or it may lead to plugged ducts or a breast infection.  (We do not recommend that you bind your breasts with any type of wrap.)    2. If the breasts begin to feel heavy, warm or uncomfortably full, begin using cold compresses on your breasts. Cold compresses can relieve the swelling and provide comfort.  Cold application can be in the form of ice packs, gel packs, frozen bags of vegetables or frozen wet towels. Cold compresses should be  wrapped in a thin towel or a pillow case and applied to the breasts  for 20 minutes at a time. This process can be repeated with a short break in between the applications of cold compresses until the swelling is relieved.     3.  Cold cabbage compresses can also be used to relieve pain and swelling.  Chilled cabbage leaves show similar pain reducing effects as cold compresses.  Some mothers prefer the cabbage leaves due to a stronger, more immediate effect. Cabbage leave effects are not clinically proven but many mothers find them very soothing.    How to apply chilled cabbage compresses:  rinse with cool water and refrigerate raw cabbage leaves.  Strip the large vein off the cold cabbage leaf and put the remaining part of the cabbage leaf directly on the breast. The leaves should be worn inside the bra until they wilt, usually within 2-4 hours.  When they wilt they should be replaced with fresh leaves.   If redness,  rash, or itching occur stop use immediately.    4. Anti-inflammatory medications such as ibuprophen may be taken, with your physicians approval, to reduce inflammation and discomfort.     6. If fullness persists and remains painful even after all of the above measures are used, hand expressing a small amount of milk out of the breasts can provide an extra measure of comfort.  Warm showers, letting the warm water hit your back and roll over your shoulders to the breasts, while  you gently express milk can make hand expressing a little easier. You should only remove enough milk to provide comfort and relief.   Repeat this process as often as needed to keep the breasts comfortable.    7. You can pump your breasts and remove just enough milk to feel softer, but not so much that more milk production is stimulated.   Repeat this process as often as needed to keep the breasts comfortable.    8. There is no need to limit the amount of fluids that you drink.       9. Engorgement will usually get better within 24-48 hrs; however, there may be some fullness and/or leaking of milk for several days. Wear breast pads to absorb any leaking milk and change them frequently.    10. If you have any flu-like symptoms such as fever, chills, feeling tired and achy or red areas on your breast, call your physician immediately.    11.Call the [facility], [phone], if the engorgement is not relieved or if you have questions.

## 2018-07-07 NOTE — PLAN OF CARE
Problem: Patient Care Overview  Goal: Plan of Care Review  Outcome: Ongoing (interventions implemented as appropriate)     Patient ambulating independently. Tolerating regular diet.  LTV incision with aquacel dressing dry and intact with small amount of marked drainage.  Voiding and passing flatus.  Simethicone administered for gas discomfort.  Pain well controlled with ordered medications.  VSS.  Lochia rubra and light.  Bonding with baby AEB holding, feeding, dressing, and changing baby's diaper. Smiles appropriately at baby. Significant other at bedside supportive of patient's needs.  POC reviewed with pt; able to verbalize acceptance and understanding. Questions answered/encouraged; reassurance provided.  Call light in reach, side rails up x2, nonskid socks on, bed in lowest position with wheels locked.  Remains free from falls and injury.   NAD noted. Will continue to monitor.

## 2018-07-07 NOTE — PLAN OF CARE
Problem: Patient Care Overview  Goal: Plan of Care Review  Outcome: Ongoing (interventions implemented as appropriate)    0800 - vss, nad, pain well controlled w/po pain meds, tolerating regular diet, passing gas, appears to be bonding well w/infant.  Poc: pain management, ambulation encouraged, possible d/c home today.  Reviewed poc w/pt.  Pt verbalized understanding.    1100 - reviewed discharge instructions and meds w/pt.  Dc rx stapled to instructions.  Pt to bring rx to pharmacy to get meds filled.  Pt verbalized understanding.  hibiclens and interdry x1 given to pt for use @ home once dressing is removed.  Instructions given.  Pt verbalized understanding.  Scant drainage noted to dressing, no additional drainage outside of marked area.  Vss, nad, pain well controlled w/po pain meds, tolerating regular diet, passing gas, appears to be bonding well w/infant upon d/c.      1140 - pt ambulated off unit, accompanied by significant other w/infant in [facility].

## 2018-07-07 NOTE — PROGRESS NOTES
"POD #2 s/p     Subjective: No complaints. Positive flatus. Ambulating, voiding, mckenzie. Reg diet  Objective: /70 (BP Location: Right arm, Patient Position: Lying)   Pulse 65   Temp 98.1 °F (36.7 °C) (Oral)   Resp 18   Ht 5' 5" (1.651 m)   Wt 114.8 kg (253 lb)   LMP 2017 (Exact Date)   SpO2 99%   Breastfeeding? Unknown   BMI 42.10 kg/m²         Chest: Clear to auscultation  CV: Regular rate and rhythm  Abdomen: Non-tender, non-distended, soft, positive bowel sounds  Incision: Clean, dry, intact  Extremities: Non-tender, no edema    Assessment: S/P     Plan: Anticipate Discharge home today/ tomorrow. Follow up 5-7days for staple removal. Instructions given.  "

## 2018-07-07 NOTE — DISCHARGE SUMMARY
Delivery Discharge Summary  Obstetrics      Primary OB Clinician: Kathryn Medina MD    Admission date: 2018  Discharge date: 2018    Admit Dx:   Patient Active Problem List   Diagnosis    Insufficient prenatal care in third trimester    History of     Prenatal care, subsequent pregnancy in third trimester    Encounter for triage in pregnant patient     labor in third trimester     delivery delivered     Discharge Dx:   Patient Active Problem List   Diagnosis    Insufficient prenatal care in third trimester    History of     Prenatal care, subsequent pregnancy in third trimester    Encounter for triage in pregnant patient     labor in third trimester     delivery delivered       Procedure: , due to repeat C/S    Hospital Course:  Pt is a 23 y.o. now  by , due to repeat C/S, POD # 2 was admitted on 2018. On initial assessment, vital signs were stable and physical exam was Normal.  Patient was subsequently admitted to labor and delivery unit with signed consents.  Patient subsequently delivered a viable infant, please see delivery information below or full delivery note for further details. Pt was in stable condition post delivery and was transferred to the Mother-Baby Unit in a stable condition. Her postpartum course was uncomplicated. On discharge day, patient's pain is well  controlled with oral pain medications. Pt is tolerating ambulation without SOB or CP, and PO diet without N/V. Reports lochia is minimal in degree. Denies any HA, vision changes, F/C, LE swelling. Denies any breast pain/soreness.  Pt in stable condition and ready for discharge, instructed to continue PNV, Iron supplement, and to follow up in the OB clinic in 1 weeks with Dr. Muñoz.      Delivery:    Episiotomy: None   Lacerations: None   Repair suture:     Repair # of packets: 8   Blood loss (ml): 0     Birth information:  YOB: 2017    Time of birth: 5:52 AM   Sex: female   Delivery type: , Low Transverse   Gestational Age: 35w5d    Delivery Clinician:      Other providers:       Additional  information:  Forceps:    Vacuum:    Breech:    Observed anomalies      Living?:           APGARS  One minute Five minutes Ten minutes   Skin color:         Heart rate:         Grimace:         Muscle tone:         Breathing:         Totals: 9  9        Placenta: Delivered:       appearance      Patient Instructions:   Current Discharge Medication List      CONTINUE these medications which have NOT CHANGED    Details   PNV CMB#21/IRON/FOLIC ACID (PRENATAL COMPLETE ORAL) Take by mouth.             No discharge procedures on file.

## 2018-07-18 ENCOUNTER — TELEPHONE (OUTPATIENT)
Dept: OBSTETRICS AND GYNECOLOGY | Facility: CLINIC | Age: 23
End: 2018-07-18

## 2018-07-18 RX ORDER — OXYCODONE AND ACETAMINOPHEN 5; 325 MG/1; MG/1
1 TABLET ORAL EVERY 6 HOURS PRN
Qty: 30 TABLET | Refills: 0 | Status: SHIPPED | OUTPATIENT
Start: 2018-07-18 | End: 2018-07-20

## 2018-07-18 NOTE — TELEPHONE ENCOUNTER
----- Message from Shira Maciel sent at 7/18/2018  8:33 AM CDT -----  Contact: [phone]/self  Patient is requesting a call back. She needs more pain med. She is out of them now. Please advise.

## 2018-07-20 ENCOUNTER — OFFICE VISIT (OUTPATIENT)
Dept: OBSTETRICS AND GYNECOLOGY | Facility: CLINIC | Age: 23
End: 2018-07-20
Payer: MEDICAID

## 2018-07-20 VITALS
BODY MASS INDEX: 38.79 KG/M2 | DIASTOLIC BLOOD PRESSURE: 60 MMHG | WEIGHT: 232.81 LBS | SYSTOLIC BLOOD PRESSURE: 120 MMHG | HEIGHT: 65 IN

## 2018-07-20 DIAGNOSIS — Z30.9 ENCOUNTER FOR CONTRACEPTIVE MANAGEMENT, UNSPECIFIED TYPE: ICD-10-CM

## 2018-07-20 DIAGNOSIS — Z09 POSTOPERATIVE EXAMINATION: ICD-10-CM

## 2018-07-20 PROCEDURE — 99999 PR PBB SHADOW E&M-EST. PATIENT-LVL III: CPT | Mod: PBBFAC,,, | Performed by: OBSTETRICS & GYNECOLOGY

## 2018-07-20 PROCEDURE — 99213 OFFICE O/P EST LOW 20 MIN: CPT | Mod: PBBFAC,PO | Performed by: OBSTETRICS & GYNECOLOGY

## 2018-07-20 RX ORDER — NORGESTIMATE AND ETHINYL ESTRADIOL 7DAYSX3 28
1 KIT ORAL DAILY
Qty: 28 TABLET | Refills: 12 | Status: SHIPPED | OUTPATIENT
Start: 2018-07-20 | End: 2019-02-13

## 2018-07-20 RX ORDER — IBUPROFEN 800 MG/1
800 TABLET ORAL EVERY 8 HOURS PRN
Refills: 0 | COMMUNITY
Start: 2018-07-07 | End: 2019-02-13

## 2018-07-20 RX ORDER — OXYCODONE AND ACETAMINOPHEN 5; 325 MG/1; MG/1
1 TABLET ORAL EVERY 4 HOURS PRN
COMMUNITY
End: 2019-02-13

## 2018-07-20 NOTE — PROGRESS NOTES
Patient presents for post-operative follow-up exam. She is healing well from the procedure and continues to improve.  No significant post-op problems or complications are encountered at this time.   Any remaining suture material is removed and continuing wound care instructions given.  All pertinent pathology reports have been reviewed.  Instructions for increased activity level have been given.  Return for next scheduled appointment or call prn any questions or problems.    Patient presents for postpartum follow-up exam. She is healing well and continues to improve.  No significant problems or complications are encountered at this time.   Continuing care instructions given including contraception and lactation as appropriate.  Instructions for increased activity have been given.  Return for next scheduled appointment or call prn any questions or problems.

## 2018-07-31 ENCOUNTER — TELEPHONE (OUTPATIENT)
Dept: OBSTETRICS AND GYNECOLOGY | Facility: CLINIC | Age: 23
End: 2018-07-31

## 2018-07-31 NOTE — TELEPHONE ENCOUNTER
Returned patients call.   Patient was notified that she if she is still in pain to come in for evaluation. Verbalized understanding

## 2018-07-31 NOTE — TELEPHONE ENCOUNTER
----- Message from Lulu Morrison sent at 7/31/2018  2:37 PM CDT -----  Contact: [phone]/ self  Patient requesting to speak with you regarding a refill of oxyCODONE-acetaminophen (PERCOCET) 5-325 mg per tablet being sent to [place] [id number] [address]. Please advise.

## 2018-10-08 PROBLEM — O60.03 PRETERM LABOR IN THIRD TRIMESTER: Status: RESOLVED | Noted: 2018-07-05 | Resolved: 2018-10-08

## 2019-02-13 ENCOUNTER — OFFICE VISIT (OUTPATIENT)
Dept: OBSTETRICS AND GYNECOLOGY | Facility: CLINIC | Age: 24
End: 2019-02-13
Payer: MEDICAID

## 2019-02-13 VITALS
WEIGHT: 241.38 LBS | BODY MASS INDEX: 40.22 KG/M2 | SYSTOLIC BLOOD PRESSURE: 120 MMHG | HEIGHT: 65 IN | DIASTOLIC BLOOD PRESSURE: 50 MMHG

## 2019-02-13 DIAGNOSIS — Z34.81 PRENATAL CARE, SUBSEQUENT PREGNANCY IN FIRST TRIMESTER: Primary | ICD-10-CM

## 2019-02-13 PROCEDURE — 99203 PR OFFICE/OUTPT VISIT, NEW, LEVL III, 30-44 MIN: ICD-10-PCS | Mod: S$PBB,TH,, | Performed by: OBSTETRICS & GYNECOLOGY

## 2019-02-13 PROCEDURE — 99999 PR PBB SHADOW E&M-EST. PATIENT-LVL III: ICD-10-PCS | Mod: PBBFAC,,, | Performed by: OBSTETRICS & GYNECOLOGY

## 2019-02-13 PROCEDURE — 99213 OFFICE O/P EST LOW 20 MIN: CPT | Mod: PBBFAC,TH,PO | Performed by: OBSTETRICS & GYNECOLOGY

## 2019-02-13 PROCEDURE — 99999 PR PBB SHADOW E&M-EST. PATIENT-LVL III: CPT | Mod: PBBFAC,,, | Performed by: OBSTETRICS & GYNECOLOGY

## 2019-02-13 PROCEDURE — 99203 OFFICE O/P NEW LOW 30 MIN: CPT | Mod: S$PBB,TH,, | Performed by: OBSTETRICS & GYNECOLOGY

## 2019-02-13 NOTE — PROGRESS NOTES
Patient presents today with amenorrhea. An office UPT confirms pregnancy. Initial OB ultrasound is scheduled for confirmation of viability and dates.  Laboratory studies have been ordered.   The general plan for obstetrical visits, diagnostics, medication use and avoidance, physician on-call arrangements, and appropriate lifestyle adjustments were discussed.  Patient history reviewed and all questions and concerns were addressed.  History of .  Wants NEXPLANON after delivery.    LMP 2018; EDC 2019EGA 7w but had 2 periods in 2018.  U/S and labs ordered.  Informed she will need to switch to different OB in group since I will not be doing deliveries by her EDC.

## 2019-02-25 ENCOUNTER — PROCEDURE VISIT (OUTPATIENT)
Dept: OBSTETRICS AND GYNECOLOGY | Facility: CLINIC | Age: 24
End: 2019-02-25
Payer: MEDICAID

## 2019-02-25 ENCOUNTER — LAB VISIT (OUTPATIENT)
Dept: LAB | Facility: HOSPITAL | Age: 24
End: 2019-02-25
Attending: OBSTETRICS & GYNECOLOGY
Payer: MEDICAID

## 2019-02-25 DIAGNOSIS — Z34.81 PRENATAL CARE, SUBSEQUENT PREGNANCY IN FIRST TRIMESTER: ICD-10-CM

## 2019-02-25 LAB
ABO + RH BLD: NORMAL
BASOPHILS # BLD AUTO: 0.02 K/UL
BASOPHILS NFR BLD: 0.2 %
BLD GP AB SCN CELLS X3 SERPL QL: NORMAL
DIFFERENTIAL METHOD: ABNORMAL
EOSINOPHIL # BLD AUTO: 0.1 K/UL
EOSINOPHIL NFR BLD: 0.8 %
ERYTHROCYTE [DISTWIDTH] IN BLOOD BY AUTOMATED COUNT: 14.2 %
HCT VFR BLD AUTO: 35.3 %
HGB BLD-MCNC: 11.6 G/DL
LYMPHOCYTES # BLD AUTO: 2.3 K/UL
LYMPHOCYTES NFR BLD: 22 %
MCH RBC QN AUTO: 28.6 PG
MCHC RBC AUTO-ENTMCNC: 32.9 G/DL
MCV RBC AUTO: 87 FL
MONOCYTES # BLD AUTO: 0.5 K/UL
MONOCYTES NFR BLD: 4.7 %
NEUTROPHILS # BLD AUTO: 7.4 K/UL
NEUTROPHILS NFR BLD: 72.3 %
PLATELET # BLD AUTO: 334 K/UL
PMV BLD AUTO: 10.1 FL
RBC # BLD AUTO: 4.06 M/UL
WBC # BLD AUTO: 10.25 K/UL

## 2019-02-25 PROCEDURE — 76801 PR US, OB <14WKS, TRANSABD, SINGLE GESTATION: ICD-10-PCS | Mod: 26,S$PBB,, | Performed by: OBSTETRICS & GYNECOLOGY

## 2019-02-25 PROCEDURE — 86703 HIV-1/HIV-2 1 RESULT ANTBDY: CPT

## 2019-02-25 PROCEDURE — 85025 COMPLETE CBC W/AUTO DIFF WBC: CPT

## 2019-02-25 PROCEDURE — 36415 COLL VENOUS BLD VENIPUNCTURE: CPT

## 2019-02-25 PROCEDURE — 86762 RUBELLA ANTIBODY: CPT

## 2019-02-25 PROCEDURE — 86592 SYPHILIS TEST NON-TREP QUAL: CPT

## 2019-02-25 PROCEDURE — 76801 OB US < 14 WKS SINGLE FETUS: CPT | Mod: PBBFAC,PO | Performed by: OBSTETRICS & GYNECOLOGY

## 2019-02-25 PROCEDURE — 76801 OB US < 14 WKS SINGLE FETUS: CPT | Mod: 26,S$PBB,, | Performed by: OBSTETRICS & GYNECOLOGY

## 2019-02-25 PROCEDURE — 86850 RBC ANTIBODY SCREEN: CPT

## 2019-02-25 PROCEDURE — 87340 HEPATITIS B SURFACE AG IA: CPT

## 2019-02-26 LAB
HBV SURFACE AG SERPL QL IA: NEGATIVE
HIV 1+2 AB+HIV1 P24 AG SERPL QL IA: NEGATIVE
RPR SER QL: NORMAL
RUBV IGG SER-ACNC: 20.5 IU/ML
RUBV IGG SER-IMP: REACTIVE

## 2019-03-12 ENCOUNTER — TELEPHONE (OUTPATIENT)
Dept: OBSTETRICS AND GYNECOLOGY | Facility: CLINIC | Age: 24
End: 2019-03-12

## 2019-03-12 NOTE — TELEPHONE ENCOUNTER
Left detailed message explaining that physician is out of the office but we still have another physician available to see her for her appointment. Will rescheduled once patient calls back.

## 2019-03-12 NOTE — TELEPHONE ENCOUNTER
----- Message from Maxine Lo sent at 3/12/2019  9:17 AM CDT -----  Contact: Self [phone]  Patient Returning Your Phone Call

## 2019-03-14 ENCOUNTER — ROUTINE PRENATAL (OUTPATIENT)
Dept: OBSTETRICS AND GYNECOLOGY | Facility: CLINIC | Age: 24
End: 2019-03-14
Payer: MEDICAID

## 2019-03-14 VITALS
DIASTOLIC BLOOD PRESSURE: 76 MMHG | SYSTOLIC BLOOD PRESSURE: 124 MMHG | WEIGHT: 249.81 LBS | BODY MASS INDEX: 41.57 KG/M2

## 2019-03-14 DIAGNOSIS — O23.41 UTI (URINARY TRACT INFECTION) DURING PREGNANCY, FIRST TRIMESTER: ICD-10-CM

## 2019-03-14 DIAGNOSIS — O21.9 NAUSEA/VOMITING IN PREGNANCY: ICD-10-CM

## 2019-03-14 DIAGNOSIS — O09.211 HIGH RISK PREGNANCY DUE TO HISTORY OF PRETERM LABOR IN FIRST TRIMESTER: ICD-10-CM

## 2019-03-14 DIAGNOSIS — Z34.81 ENCOUNTER FOR SUPERVISION OF OTHER NORMAL PREGNANCY IN FIRST TRIMESTER: Primary | ICD-10-CM

## 2019-03-14 PROCEDURE — 99999 PR PBB SHADOW E&M-EST. PATIENT-LVL III: CPT | Mod: PBBFAC,,, | Performed by: OBSTETRICS & GYNECOLOGY

## 2019-03-14 PROCEDURE — 99213 OFFICE O/P EST LOW 20 MIN: CPT | Mod: PBBFAC,TH,PO | Performed by: OBSTETRICS & GYNECOLOGY

## 2019-03-14 PROCEDURE — 88142 CYTOPATH C/V THIN LAYER: CPT

## 2019-03-14 PROCEDURE — 99201 PR OFFICE/OUTPT VISIT,NEW,LEVL I: ICD-10-PCS | Mod: S$PBB,TH,, | Performed by: OBSTETRICS & GYNECOLOGY

## 2019-03-14 PROCEDURE — 87491 CHLMYD TRACH DNA AMP PROBE: CPT

## 2019-03-14 PROCEDURE — 99999 PR PBB SHADOW E&M-EST. PATIENT-LVL III: ICD-10-PCS | Mod: PBBFAC,,, | Performed by: OBSTETRICS & GYNECOLOGY

## 2019-03-14 PROCEDURE — 87086 URINE CULTURE/COLONY COUNT: CPT

## 2019-03-14 PROCEDURE — 99201 PR OFFICE/OUTPT VISIT,NEW,LEVL I: CPT | Mod: S$PBB,TH,, | Performed by: OBSTETRICS & GYNECOLOGY

## 2019-03-14 RX ORDER — NITROFURANTOIN 25; 75 MG/1; MG/1
100 CAPSULE ORAL 2 TIMES DAILY
Qty: 14 CAPSULE | Refills: 0 | Status: SHIPPED | OUTPATIENT
Start: 2019-03-14 | End: 2019-03-21

## 2019-03-14 RX ORDER — PROMETHAZINE HYDROCHLORIDE 25 MG/1
25 TABLET ORAL
Qty: 30 TABLET | Refills: 1 | Status: SHIPPED | OUTPATIENT
Start: 2019-03-14 | End: 2019-04-13

## 2019-03-14 NOTE — PROGRESS NOTES
Initial OB visit - high risk due to prev c/s x 3 and questionable h/o  labor at 35w5.  Pt states she was told she was farther along, was dated by a 32wk US, but baby only weighed 5.6#.  Will discuss 17-OHP nv.   Today she is overall doing well.  C/o nausea, occ vomiting, but overall tolerating PO each day.  Denies any vb/spotting.  Denies dysuria, although nitrites positive on UA.  eRx macrobid and cx pending.  US reviewed - c/w LMP; 3cm right ovarian cyst noted.  Discussed with pt.  Will reevaluate at anatomy scan.  Precautions given.  Counseling done.  RTC 4 wks for OB visit and quad screen.

## 2019-03-16 LAB
C TRACH DNA SPEC QL NAA+PROBE: NOT DETECTED
N GONORRHOEA DNA SPEC QL NAA+PROBE: NOT DETECTED

## 2019-03-17 LAB
BACTERIA UR CULT: NORMAL
BACTERIA UR CULT: NORMAL

## 2019-04-11 ENCOUNTER — ROUTINE PRENATAL (OUTPATIENT)
Dept: OBSTETRICS AND GYNECOLOGY | Facility: CLINIC | Age: 24
End: 2019-04-11
Payer: MEDICAID

## 2019-04-11 ENCOUNTER — LAB VISIT (OUTPATIENT)
Dept: LAB | Facility: HOSPITAL | Age: 24
End: 2019-04-11
Attending: OBSTETRICS & GYNECOLOGY
Payer: MEDICAID

## 2019-04-11 VITALS
SYSTOLIC BLOOD PRESSURE: 100 MMHG | BODY MASS INDEX: 40.76 KG/M2 | WEIGHT: 244.94 LBS | DIASTOLIC BLOOD PRESSURE: 60 MMHG

## 2019-04-11 DIAGNOSIS — O09.892 HISTORY OF PRETERM DELIVERY, CURRENTLY PREGNANT IN SECOND TRIMESTER: ICD-10-CM

## 2019-04-11 DIAGNOSIS — Z34.81 ENCOUNTER FOR SUPERVISION OF OTHER NORMAL PREGNANCY IN FIRST TRIMESTER: Primary | ICD-10-CM

## 2019-04-11 DIAGNOSIS — Z34.81 ENCOUNTER FOR SUPERVISION OF OTHER NORMAL PREGNANCY IN FIRST TRIMESTER: ICD-10-CM

## 2019-04-11 DIAGNOSIS — O34.219 PREVIOUS CESAREAN SECTION COMPLICATING PREGNANCY: ICD-10-CM

## 2019-04-11 PROBLEM — Z34.83 PRENATAL CARE, SUBSEQUENT PREGNANCY IN THIRD TRIMESTER: Status: RESOLVED | Noted: 2018-06-25 | Resolved: 2019-04-11

## 2019-04-11 PROBLEM — Z36.89 ENCOUNTER FOR TRIAGE IN PREGNANT PATIENT: Status: RESOLVED | Noted: 2018-07-05 | Resolved: 2019-04-11

## 2019-04-11 PROBLEM — O09.33 INSUFFICIENT PRENATAL CARE IN THIRD TRIMESTER: Status: RESOLVED | Noted: 2018-06-04 | Resolved: 2019-04-11

## 2019-04-11 PROCEDURE — 99212 OFFICE O/P EST SF 10 MIN: CPT | Mod: PBBFAC,TH,PO | Performed by: OBSTETRICS & GYNECOLOGY

## 2019-04-11 PROCEDURE — 36415 COLL VENOUS BLD VENIPUNCTURE: CPT

## 2019-04-11 PROCEDURE — 99999 PR PBB SHADOW E&M-EST. PATIENT-LVL II: ICD-10-PCS | Mod: PBBFAC,,, | Performed by: OBSTETRICS & GYNECOLOGY

## 2019-04-11 PROCEDURE — 99999 PR PBB SHADOW E&M-EST. PATIENT-LVL II: CPT | Mod: PBBFAC,,, | Performed by: OBSTETRICS & GYNECOLOGY

## 2019-04-11 PROCEDURE — 99213 OFFICE O/P EST LOW 20 MIN: CPT | Mod: S$PBB,TH,, | Performed by: OBSTETRICS & GYNECOLOGY

## 2019-04-11 PROCEDURE — 99213 PR OFFICE/OUTPT VISIT, EST, LEVL III, 20-29 MIN: ICD-10-PCS | Mod: S$PBB,TH,, | Performed by: OBSTETRICS & GYNECOLOGY

## 2019-04-11 PROCEDURE — 87086 URINE CULTURE/COLONY COUNT: CPT

## 2019-04-11 PROCEDURE — 81511 FTL CGEN ABNOR FOUR ANAL: CPT

## 2019-04-11 RX ORDER — HYDROXYPROGESTERONE CAPROATE 250 MG/ML
250 INJECTION INTRAMUSCULAR
Qty: 4 ML | Refills: 4 | Status: SHIPPED | OUTPATIENT
Start: 2019-04-11 | End: 2019-05-23 | Stop reason: SDUPTHER

## 2019-04-11 NOTE — PROGRESS NOTES
23yo  at 16w0d.  C/o cough and congestion x 4 days.  No fevers/chills.  No other complaints today.  No ctx/vb/lof.  + Flutter.  Taking PNV.    A/P:  23yo  at 16w0d  - h/o PTD at 35w5d. Discussed 17-OHP, will submit paperwork today.  - prev c/s x 3, desires repeat.  - Safe meds list given.  Recommend robitussin, sudafed, etc.  Encourage PO hydration.  Counseling done.  - Quad screen today.  - Counseling done, precautions given, all questions answered.  RTC 4 wks for OB visit and anatomy US.

## 2019-04-14 LAB
BACTERIA UR CULT: NORMAL
BACTERIA UR CULT: NORMAL

## 2019-04-15 ENCOUNTER — TELEPHONE (OUTPATIENT)
Dept: PHARMACY | Facility: CLINIC | Age: 24
End: 2019-04-15

## 2019-04-15 LAB
# FETUSES US: NORMAL
2ND TRIMESTER 4 SCREEN PNL SERPL: NEGATIVE
2ND TRIMESTER 4 SCREEN SERPL-IMP: NORMAL
AFP MOM SERPL: 1.28
AFP SERPL-MCNC: 32.7 NG/ML
AGE AT DELIVERY: 23
B-HCG MOM SERPL: 0.93
B-HCG SERPL-ACNC: 25.3 IU/ML
FET TS 21 RISK FROM MAT AGE: NORMAL
GA (DAYS): 0 D
GA (WEEKS): 16 WK
GA METHOD: NORMAL
IDDM PATIENT QL: NORMAL
INHIBIN A MOM SERPL: 1.4
INHIBIN A SERPL-MCNC: 191.1 PG/ML
SMOKING STATUS FTND: NO
TS 18 RISK FETUS: NORMAL
TS 21 RISK FETUS: NORMAL
U ESTRIOL MOM SERPL: 0.86
U ESTRIOL SERPL-MCNC: 0.57 NG/ML

## 2019-04-16 ENCOUNTER — TELEPHONE (OUTPATIENT)
Dept: OBSTETRICS AND GYNECOLOGY | Facility: CLINIC | Age: 24
End: 2019-04-16

## 2019-04-16 NOTE — TELEPHONE ENCOUNTER
----- Message from Anne Abel sent at 4/16/2019 12:40 PM CDT -----  Contact: self  Pt called to speak with nurse about symptoms getting worst from the cold .      Pt callback number [phone]

## 2019-04-24 NOTE — TELEPHONE ENCOUNTER
Niurka 250mg/ml (IM) consultation and obtain consent for delivery to [facility] attempted.  No answer.  LVM for call back.  MyChart not available.  $0 copay in 004.  GA 16w0d (4/11/19).

## 2019-04-25 ENCOUNTER — TELEPHONE (OUTPATIENT)
Dept: OBSTETRICS AND GYNECOLOGY | Facility: CLINIC | Age: 24
End: 2019-04-25

## 2019-04-25 NOTE — TELEPHONE ENCOUNTER
----- Message from Yamilet García sent at 4/25/2019 10:31 AM CDT -----  Contact: Self [phone]  Patient would like to speak with you about needing an injection. Please advise

## 2019-04-25 NOTE — TELEPHONE ENCOUNTER
----- Message from Keyonna De La Paz sent at 4/25/2019  1:57 PM CDT -----  Contact: self, [phone]  Patient called in returning your call. Phone number on account was corrected. Please advise.

## 2019-04-25 NOTE — TELEPHONE ENCOUNTER
----- Message from Iram Gutierrez sent at 4/25/2019 11:30 AM CDT -----  Contact: Tanisha pt  Type: Needs Medical Advice    Who Called:  Tanisha  Symptoms (please be specific):  Shot for pre-labor  How long has patient had these symptoms:  ongoing  Best Call Back Number: [phone]  Additional Information: Pls call pt regarding the shot.

## 2019-04-26 NOTE — TELEPHONE ENCOUNTER
Patient provided Neel 250mg/ml counseling on 19. [place] injection will be coordinated with MD office for administration. $0 copay - patient informed. Confirmed 2 patient identifiers - name and .  Pt ~18 weeks pregnant.    Counseled patient on administration directions:  Inject Neel 250mg/ml into the muscle once weekly (every 7 days). To be administered in MD office.  - Treatment may begin between 16 weeks 0 days and 20 weeks 6 days of gestation. Continue weekly administration until 37 weeks gestation or until delivery, whichever comes first.  ?  Patient was counseled on possible side effects:  ·   The most common side effects of NEEL include:   pain, swelling, itching or a hard bump at the injection site  hives  itching  nausea  diarrhea  Call your healthcare provider if you have the following at your injection site:  increased pain over time  oozing of blood or fluid  swelling     ·   NEEL may cause serious side effects, including:   Blood clots. Symptoms of a blood clot may include;  leg swelling  redness in your leg  a spot on your leg that is warm to the touch  leg pain that gets worse when you bend your foot  Call your healthcare provider right away if you get any of the symptoms above during treatment with NEEL.  Allergic reactions. Symptoms of an allergic reaction may include:  hives  itching  swelling of the face  Call your healthcare provider right away if you get any of the symptoms above during treatment with NEEL.  Decrease in glucose (blood sugar) tolerance. Your healthcare provider will need to monitor your blood sugar while taking NEEL if you have diabetes or pre-diabetes.  Your body may hold too much fluid (fluid retention).  Depression.  Yellowing of your skin and the whites of your eyes (jaundice).  High blood pressure.     DDIs:  Medication list and allergies reviewed.  No DDIs.    Patient verbalized understanding. I will f/u with her in 1-2 weeks after starte date to see how  she is doing and [facility] will contact her in 3 weeks to set up next refill. Patient advised to call [facility] should any questions arise.

## 2019-04-26 NOTE — TELEPHONE ENCOUNTER
The address is [address] [address].  Her next appointment is 5/9/19 if we could get it before then that would be great.     Thanks!

## 2019-05-08 ENCOUNTER — TELEPHONE (OUTPATIENT)
Dept: OBSTETRICS AND GYNECOLOGY | Facility: CLINIC | Age: 24
End: 2019-05-08

## 2019-05-08 NOTE — TELEPHONE ENCOUNTER
----- Message from Maxine Lo sent at 5/8/2019 10:08 AM CDT -----  Contact: Zulma Contreras from [facility] [phone]  Calling to talk to nurse in regards to patients Ayesha.

## 2019-05-14 ENCOUNTER — TELEPHONE (OUTPATIENT)
Dept: OBSTETRICS AND GYNECOLOGY | Facility: CLINIC | Age: 24
End: 2019-05-14

## 2019-05-14 NOTE — TELEPHONE ENCOUNTER
----- Message from Nori Mcmahon MD sent at 5/14/2019  1:14 PM CDT -----  Can you please call Tanisha BucknerBlanc and schedule her for her [place] injection ASAP?  We have had her medicine for a week now but she has not been scheduled for an injection.  Thank you!

## 2019-05-15 ENCOUNTER — TELEPHONE (OUTPATIENT)
Dept: OBSTETRICS AND GYNECOLOGY | Facility: CLINIC | Age: 24
End: 2019-05-15

## 2019-05-15 ENCOUNTER — CLINICAL SUPPORT (OUTPATIENT)
Dept: OBSTETRICS AND GYNECOLOGY | Facility: CLINIC | Age: 24
End: 2019-05-15
Payer: MEDICAID

## 2019-05-15 DIAGNOSIS — Z87.51 HISTORY OF PRETERM LABOR: Primary | ICD-10-CM

## 2019-05-15 PROCEDURE — 96372 THER/PROPH/DIAG INJ SC/IM: CPT | Mod: PBBFAC,PO

## 2019-05-15 RX ORDER — HYDROXYPROGESTERONE CAPROATE 1250 MG/5ML
250 INJECTION INTRAMUSCULAR
Status: DISCONTINUED | OUTPATIENT
Start: 2019-05-16 | End: 2019-09-19

## 2019-05-15 RX ADMIN — HYDROXYPROGESTERONE CAPROATE 250 MG: 250 INJECTION INTRAMUSCULAR at 02:05

## 2019-05-15 NOTE — TELEPHONE ENCOUNTER
----- Message from Rosalie Morrison sent at 5/15/2019  1:09 PM CDT -----  Patient called to speak with your office about not being able to make her appointment for today at 1:30pm.    Please call [phone].

## 2019-05-15 NOTE — PROGRESS NOTES
5/15/19 at 1453 administered 250 mg/ 1 ml of Niurka to R upper outer ventrogluteal. Pt tolerated well.  Pt was asked to wait 15 min following injection to be sure there is no reaction.   Pt verbalized understanding.  Lot: [id number]  Exp: 10/20  Man: Desirae

## 2019-05-20 ENCOUNTER — TELEPHONE (OUTPATIENT)
Dept: OBSTETRICS AND GYNECOLOGY | Facility: CLINIC | Age: 24
End: 2019-05-20

## 2019-05-20 NOTE — TELEPHONE ENCOUNTER
----- Message from Britni Harris sent at 5/20/2019  3:31 PM CDT -----  Contact: [facility] [phone] fax  Elizabeth is calling to get clinical information for patient for at home magno injections. Please advise.

## 2019-05-21 ENCOUNTER — TELEPHONE (OUTPATIENT)
Dept: OBSTETRICS AND GYNECOLOGY | Facility: CLINIC | Age: 24
End: 2019-05-21

## 2019-05-21 NOTE — TELEPHONE ENCOUNTER
----- Message from Lucy Aguirre sent at 5/20/2019  4:43 PM CDT -----  Contact: Franklyn with [facility] [phone]  She missed a call from someone and is requesting a call back.

## 2019-05-21 NOTE — TELEPHONE ENCOUNTER
----- Message from Maxine Lo sent at 5/21/2019 10:33 AM CDT -----  Contact: Efren Calling from [facility] [phone] fax [phone]   Returning Your Phone Call

## 2019-05-21 NOTE — TELEPHONE ENCOUNTER
----- Message from Shira Maciel sent at 5/21/2019  8:36 AM CDT -----  Contact: [phone]/self  Patient called in returning your call. Please advise.

## 2019-05-23 ENCOUNTER — ROUTINE PRENATAL (OUTPATIENT)
Dept: OBSTETRICS AND GYNECOLOGY | Facility: CLINIC | Age: 24
End: 2019-05-23
Payer: MEDICAID

## 2019-05-23 ENCOUNTER — CLINICAL SUPPORT (OUTPATIENT)
Dept: OBSTETRICS AND GYNECOLOGY | Facility: CLINIC | Age: 24
End: 2019-05-23
Payer: MEDICAID

## 2019-05-23 ENCOUNTER — PROCEDURE VISIT (OUTPATIENT)
Dept: OBSTETRICS AND GYNECOLOGY | Facility: CLINIC | Age: 24
End: 2019-05-23
Payer: MEDICAID

## 2019-05-23 VITALS — WEIGHT: 261 LBS | DIASTOLIC BLOOD PRESSURE: 74 MMHG | BODY MASS INDEX: 43.44 KG/M2 | SYSTOLIC BLOOD PRESSURE: 108 MMHG

## 2019-05-23 DIAGNOSIS — Z34.81 ENCOUNTER FOR SUPERVISION OF OTHER NORMAL PREGNANCY IN FIRST TRIMESTER: ICD-10-CM

## 2019-05-23 DIAGNOSIS — O09.892 HISTORY OF PRETERM DELIVERY, CURRENTLY PREGNANT IN SECOND TRIMESTER: Primary | ICD-10-CM

## 2019-05-23 DIAGNOSIS — Z36.89 ENCOUNTER FOR FETAL ANATOMIC SURVEY: ICD-10-CM

## 2019-05-23 DIAGNOSIS — O34.219 PREVIOUS CESAREAN SECTION COMPLICATING PREGNANCY: ICD-10-CM

## 2019-05-23 PROCEDURE — 76816 OB US FOLLOW-UP PER FETUS: CPT | Mod: 26,S$PBB,, | Performed by: OBSTETRICS & GYNECOLOGY

## 2019-05-23 PROCEDURE — 99999 PR PBB SHADOW E&M-EST. PATIENT-LVL II: CPT | Mod: PBBFAC,,, | Performed by: OBSTETRICS & GYNECOLOGY

## 2019-05-23 PROCEDURE — 99212 OFFICE O/P EST SF 10 MIN: CPT | Mod: PBBFAC,TH,PO,25 | Performed by: OBSTETRICS & GYNECOLOGY

## 2019-05-23 PROCEDURE — 76816 PR  US,PREGNANT UTERUS,F/U,TRANSABD APP: ICD-10-PCS | Mod: 26,S$PBB,, | Performed by: OBSTETRICS & GYNECOLOGY

## 2019-05-23 PROCEDURE — 76816 OB US FOLLOW-UP PER FETUS: CPT | Mod: PBBFAC,PO | Performed by: OBSTETRICS & GYNECOLOGY

## 2019-05-23 PROCEDURE — 99213 OFFICE O/P EST LOW 20 MIN: CPT | Mod: S$PBB,TH,, | Performed by: OBSTETRICS & GYNECOLOGY

## 2019-05-23 PROCEDURE — 99999 PR PBB SHADOW E&M-EST. PATIENT-LVL II: ICD-10-PCS | Mod: PBBFAC,,, | Performed by: OBSTETRICS & GYNECOLOGY

## 2019-05-23 PROCEDURE — 99213 PR OFFICE/OUTPT VISIT, EST, LEVL III, 20-29 MIN: ICD-10-PCS | Mod: S$PBB,TH,, | Performed by: OBSTETRICS & GYNECOLOGY

## 2019-05-23 NOTE — PROGRESS NOTES
25yo  at 22w0d.  H/o PTD, currently on 17-OHP.  No complaints today.  No ctx/vb/lof.  +FM.  Taking PNV.    Anatomy US: Overall wnl with some suboptimal views.  EFW 31%.    A/P:  25yo  at 22w0d, insufficient PNC (last seen at 16wga)  - h/o PTD at 35w5d, currently on 17-OHP  - prev c/s x 3, desires repeat.  Considering BTL.  Discussed LARC and risk of regret at a young age with BTL.  Pt will consider, discuss again later in pregnancy.  - Counseling done, precautions given, all questions answered.  RTC weekly for 17-OHP; 3 wks for OB visit and glucola.

## 2019-05-23 NOTE — PROGRESS NOTES
Pt received magno 250mg Subq injection to the L arm. Pt tolerated well.  Lot [id number]  Exp 11/20  Patient supplied

## 2019-05-27 NOTE — TELEPHONE ENCOUNTER
Niurka follow up. Confirmed two patient identifiers - name + . She confirms getting 2 injections already, the first on 5/15/19.  Patient states that the 1st injection was an IM injection in her right hip, the 2nd injection was a SQ pen in her arm. She had pain with both, but the pen left a burning sensation for an hour after the injection. She would like to proceed with IM injections. Explained to patient that insurance approved the vials (IM) injections as well, so [facility] will communicate with providers and try to see why the SQ injection was given to her. She has her next appt on 19 at 3pm and advised to have nurse/MDO call [facility] if there are any questions - she should continue to get the vials, [facility] sent 4 doses on 19. Routing provider to inquire on why she was switched to the SQ Pens. She denies any other changes to allergies, health conditions, medications or insurance. Pt advised to call [facility] should further questions arise, [facility] will continue to reach out to patient for refills. SMITH

## 2019-06-06 ENCOUNTER — TELEPHONE (OUTPATIENT)
Dept: OBSTETRICS AND GYNECOLOGY | Facility: CLINIC | Age: 24
End: 2019-06-06

## 2019-06-06 NOTE — TELEPHONE ENCOUNTER
----- Message from Lulu Morrison sent at 6/6/2019  1:03 PM CDT -----  Contact: [phone]/ Madeleine with [facility]  Called in to confirm it's ok to ship out pts medications. Please advise.

## 2019-06-07 ENCOUNTER — TELEPHONE (OUTPATIENT)
Dept: OBSTETRICS AND GYNECOLOGY | Facility: CLINIC | Age: 24
End: 2019-06-07

## 2019-06-07 ENCOUNTER — HOSPITAL ENCOUNTER (EMERGENCY)
Facility: HOSPITAL | Age: 24
Discharge: HOME | End: 2019-06-07
Attending: EMERGENCY MEDICINE
Payer: MEDICAID

## 2019-06-07 VITALS
OXYGEN SATURATION: 98 % | WEIGHT: 230 LBS | SYSTOLIC BLOOD PRESSURE: 122 MMHG | DIASTOLIC BLOOD PRESSURE: 77 MMHG | BODY MASS INDEX: 38.32 KG/M2 | RESPIRATION RATE: 18 BRPM | HEART RATE: 70 BPM | TEMPERATURE: 99 F | HEIGHT: 65 IN
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
## 2019-06-07 DIAGNOSIS — Z3A.24 24 WEEKS GESTATION OF PREGNANCY: ICD-10-CM

## 2019-06-07 DIAGNOSIS — M25.562 ACUTE PAIN OF BOTH KNEES: ICD-10-CM

## 2019-06-07 DIAGNOSIS — M25.561 ACUTE PAIN OF BOTH KNEES: ICD-10-CM

## 2019-06-07 PROCEDURE — 99282 EMERGENCY DEPT VISIT SF MDM: CPT

## 2019-06-07 NOTE — ED NOTES
Pt presents to the ED c/o fall on the is am to tile floor while mopping.  Reports pain to center of stomach near belly button and bilaterally knee pain.  Pt reports being 23 weeks pregnant.

## 2019-06-07 NOTE — ED NOTES
APPEARANCE: Alert, oriented and in no acute distress.  CARDIAC: Normal rate and rhythm, no murmur heard.   PERIPHERAL VASCULAR: peripheral pulses present. Normal cap refill. No edema. Warm to touch.    RESPIRATORY:Normal rate and effort, breath sounds clear bilaterally throughout chest. Respirations are equal and unlabored no obvious signs of distress.  GASTRO: soft, bowel sounds normal/reports pain to belly button area/hit with her knees while breaking her fall.   MUSC: Full ROM. No bony tenderness or soft tissue tenderness. No obvious deformity.Bilateral knee pain  SKIN: Skin is warm and dry, normal skin turgor, mucous membranes moist.  NEURO: 5/5 strength major flexors/extensors bilaterally. Sensory intact to light touch bilaterally. [place] coma scale: eyes open spontaneously-4, oriented & converses-5, obeys commands-6. No neurological abnormalities.   MENTAL STATUS: awake, alert and aware of environment.  EYE: PERRL, both eyes: pupils brisk and reactive to light. Normal size.  .

## 2019-06-07 NOTE — TELEPHONE ENCOUNTER
----- Message from Janet Ramirez sent at 6/7/2019  8:54 AM CDT -----  Contact: Self/ [phone]  Type:  Same Day Appointment Request    Caller is requesting a same day appointment.  Caller declined first available appointment listed below.    Name of Caller:PT  When is the first available appointment? June 11  Symptoms:Patient felt this morning while cleaning and now has bad cramps  Best Call Back Number: [phone]  Additional Information:

## 2019-06-07 NOTE — ED PROVIDER NOTES
Encounter Date: 2019    SCRIBE #1 NOTE: I, Michelle Ahumada, am scribing for, and in the presence of,  Dr. Maciel. I have scribed the entire note.       History     Chief Complaint   Patient presents with    Fall     pt. is 23 week 6 days pregnant and had a slip and fall while mopping this morning. pt. states both of her knees struck her abdomen and now having abdominal pain through to her back. denies bleeding or discharge.      Time seen by provider: 12:08 PM    This is a 24 y.o. female who is 23 weeks pregnant who presents with a complaint of a slip and fall while she was mopping this morning. Patient states she landed on her knees first and then fall back onto her buttocks. Patient states she felt her knees pop and has some pain currently. Reports to be able to bear weight and walk with no difficulty. She denies any bleeding, contractions, severe abdominal pain, or any other concerning symptoms. . Dr. Mcmahon is the patient's OB/GYN.    The history is provided by the patient.     Review of patient's allergies indicates:  No Known Allergies  Past Medical History:   Diagnosis Date    Anxiety      Past Surgical History:   Procedure Laterality Date     SECTION       SECTION N/A 2018    [facility]     SECTION N/A 10/30/2013    Performed by [facility]illem [facility] at [facility] L[facility]D    DELIVERY-CEASAREAN SECTION N/A 2015    Per[facility] at [facility] L[facility]D    TENDON RELEASE      right 3rd finger    TONSILLECTOMY       Family History   Problem Relation Age of Onset    Hypertension Maternal Grandmother     Diabetes Maternal Grandfather      Social History     Tobacco Use    Smoking status: Never Smoker    Smokeless tobacco: Never Used   Substance Use Topics    Alcohol use: No    Drug use: No     Review of Systems   Constitutional: Negative for fever.   HENT: Negative for sore throat.    Respiratory: Negative for shortness of breath.     Cardiovascular: Negative for chest pain.   Gastrointestinal: Negative for nausea.   Genitourinary: Negative for dysuria.   Musculoskeletal: Positive for arthralgias. Negative for back pain.   Skin: Negative for rash.   Neurological: Negative for weakness.   Hematological: Does not bruise/bleed easily.   All other systems reviewed and are negative.      Physical Exam     Initial Vitals [06/07/19 1156]   BP Pulse Resp Temp SpO2   123/78 73 16 98.5 °F (36.9 °C) 98 %      MAP       --         Physical Exam    Constitutional: She appears well-developed and well-nourished.  Non-toxic appearance. No distress.   HENT:   Head: Normocephalic and atraumatic.   Mouth/Throat: Oropharynx is clear and moist.   Eyes: Conjunctivae and EOM are normal. Pupils are equal, round, and reactive to light.   Neck: Normal range of motion. Neck supple. No stridor present. No tracheal deviation present.   Cardiovascular: Normal rate, regular rhythm and normal heart sounds.   No murmur heard.  Pulmonary/Chest: Breath sounds normal. No respiratory distress. She has no wheezes. She has no rhonchi. She has no rales.   Abdominal: Soft. Bowel sounds are normal. There is no tenderness. There is no rebound and no guarding.   Musculoskeletal: Normal range of motion. She exhibits no edema or tenderness.   Neurological: She is alert and oriented to person, place, and time. No sensory deficit.   Skin: Skin is warm and dry. Capillary refill takes less than 2 seconds.   Psychiatric: She has a normal mood and affect. Her behavior is normal.         ED Course   Procedures  Labs Reviewed - No data to display                               Clinical Impression:     1. Fall, initial encounter    2. Acute pain of both knees    3. 24 weeks gestation of pregnancy              I, Tri Maciel, personally performed the services described in this documentation. All medical record entries made by the scribe were at my direction and in my presence.  I have reviewed the  chart and agree that the record reflects my personal performance and is accurate and complete. Tri Maciel M.D. 1:48 PM06/07/2019                 Tri Maciel MD  06/07/19 1341

## 2019-06-07 NOTE — TELEPHONE ENCOUNTER
----- Message from Dennise Hensley sent at 6/7/2019 11:02 AM CDT -----  Contact: self / [phone]  Patient is returning a call from your office. Please advise

## 2019-06-07 NOTE — TELEPHONE ENCOUNTER
----- Message from Kasandra Richter sent at 6/7/2019 10:43 AM CDT -----  Contact: [phone]  Patient requested to speak with the nurse because she wanted to know if its okay to go to [facility]. Please call.

## 2019-06-11 ENCOUNTER — TELEPHONE (OUTPATIENT)
Dept: OBSTETRICS AND GYNECOLOGY | Facility: CLINIC | Age: 24
End: 2019-06-11

## 2019-06-11 ENCOUNTER — LAB VISIT (OUTPATIENT)
Dept: LAB | Facility: HOSPITAL | Age: 24
End: 2019-06-11
Attending: OBSTETRICS & GYNECOLOGY
Payer: MEDICAID

## 2019-06-11 ENCOUNTER — ROUTINE PRENATAL (OUTPATIENT)
Dept: OBSTETRICS AND GYNECOLOGY | Facility: CLINIC | Age: 24
End: 2019-06-11
Payer: MEDICAID

## 2019-06-11 VITALS
SYSTOLIC BLOOD PRESSURE: 120 MMHG | BODY MASS INDEX: 44.79 KG/M2 | WEIGHT: 269.19 LBS | DIASTOLIC BLOOD PRESSURE: 80 MMHG

## 2019-06-11 DIAGNOSIS — O34.219 PREVIOUS CESAREAN SECTION COMPLICATING PREGNANCY: ICD-10-CM

## 2019-06-11 DIAGNOSIS — O09.892 HISTORY OF PRETERM DELIVERY, CURRENTLY PREGNANT IN SECOND TRIMESTER: Primary | ICD-10-CM

## 2019-06-11 DIAGNOSIS — O09.892 HISTORY OF PRETERM DELIVERY, CURRENTLY PREGNANT IN SECOND TRIMESTER: ICD-10-CM

## 2019-06-11 DIAGNOSIS — O09.32 INSUFFICIENT PRENATAL CARE IN SECOND TRIMESTER: ICD-10-CM

## 2019-06-11 LAB — GLUCOSE SERPL-MCNC: 98 MG/DL (ref 70–140)

## 2019-06-11 PROCEDURE — 82950 GLUCOSE TEST: CPT

## 2019-06-11 PROCEDURE — 99213 PR OFFICE/OUTPT VISIT, EST, LEVL III, 20-29 MIN: ICD-10-PCS | Mod: S$PBB,TH,, | Performed by: OBSTETRICS & GYNECOLOGY

## 2019-06-11 PROCEDURE — 36415 COLL VENOUS BLD VENIPUNCTURE: CPT

## 2019-06-11 PROCEDURE — 99999 PR PBB SHADOW E&M-EST. PATIENT-LVL II: CPT | Mod: PBBFAC,,, | Performed by: OBSTETRICS & GYNECOLOGY

## 2019-06-11 PROCEDURE — 99999 PR PBB SHADOW E&M-EST. PATIENT-LVL II: ICD-10-PCS | Mod: PBBFAC,,, | Performed by: OBSTETRICS & GYNECOLOGY

## 2019-06-11 PROCEDURE — 99212 OFFICE O/P EST SF 10 MIN: CPT | Mod: PBBFAC,TH,PO | Performed by: OBSTETRICS & GYNECOLOGY

## 2019-06-11 PROCEDURE — 99213 OFFICE O/P EST LOW 20 MIN: CPT | Mod: S$PBB,TH,, | Performed by: OBSTETRICS & GYNECOLOGY

## 2019-06-11 NOTE — PROGRESS NOTES
23yo  at 24w5d.  H/o PTD, currently on 17-OHP but has not had injection in 2 weeks.  No complaints today.  Recently seen after a fall at another hospital, but reports cervix was closed there and baby's status was reassuring.  No ctx/vb/lof.  +FM.  Taking PNV.    A/P:  23yo  at 24w5d, insufficient PNC   - h/o PTD at 35w5d, currently on 17-OHP but noncompliant with weekly injections  - prev c/s x 3, desires repeat.  Desires BTL.  Discussed LARC and risk of regret at a young age with BTL.  Pt declines, adamantly desires BLT.  Consents signed today for blood, RCD/BTL, and circ.  - Glucola pending today.  - Counseling done, precautions given, all questions answered.  RTC weekly for 17-OHP; 3 wks for OB visit.

## 2019-07-01 ENCOUNTER — TELEPHONE (OUTPATIENT)
Dept: OBSTETRICS AND GYNECOLOGY | Facility: CLINIC | Age: 24
End: 2019-07-01

## 2019-07-01 NOTE — TELEPHONE ENCOUNTER
----- Message from Yamilet García sent at 7/1/2019 11:16 AM CDT -----  Contact: Garcia [phone]  Pharmacy would like to speak with you about shipping the Niurka. Please advise.

## 2019-07-02 ENCOUNTER — CLINICAL SUPPORT (OUTPATIENT)
Dept: OBSTETRICS AND GYNECOLOGY | Facility: CLINIC | Age: 24
End: 2019-07-02
Payer: MEDICAID

## 2019-07-02 ENCOUNTER — ROUTINE PRENATAL (OUTPATIENT)
Dept: OBSTETRICS AND GYNECOLOGY | Facility: CLINIC | Age: 24
End: 2019-07-02
Payer: MEDICAID

## 2019-07-02 VITALS
SYSTOLIC BLOOD PRESSURE: 120 MMHG | DIASTOLIC BLOOD PRESSURE: 60 MMHG | WEIGHT: 269.19 LBS | BODY MASS INDEX: 44.79 KG/M2

## 2019-07-02 DIAGNOSIS — O09.892 HISTORY OF PRETERM DELIVERY, CURRENTLY PREGNANT IN SECOND TRIMESTER: Primary | ICD-10-CM

## 2019-07-02 DIAGNOSIS — O34.219 PREVIOUS CESAREAN SECTION COMPLICATING PREGNANCY: ICD-10-CM

## 2019-07-02 DIAGNOSIS — O09.32 INSUFFICIENT PRENATAL CARE IN SECOND TRIMESTER: ICD-10-CM

## 2019-07-02 PROCEDURE — 99999 PR PBB SHADOW E&M-EST. PATIENT-LVL II: ICD-10-PCS | Mod: PBBFAC,,, | Performed by: OBSTETRICS & GYNECOLOGY

## 2019-07-02 PROCEDURE — 99212 OFFICE O/P EST SF 10 MIN: CPT | Mod: PBBFAC,TH,PO | Performed by: OBSTETRICS & GYNECOLOGY

## 2019-07-02 PROCEDURE — 99213 OFFICE O/P EST LOW 20 MIN: CPT | Mod: S$PBB,TH,, | Performed by: OBSTETRICS & GYNECOLOGY

## 2019-07-02 PROCEDURE — 99999 PR PBB SHADOW E&M-EST. PATIENT-LVL II: CPT | Mod: PBBFAC,,, | Performed by: OBSTETRICS & GYNECOLOGY

## 2019-07-02 PROCEDURE — 96372 THER/PROPH/DIAG INJ SC/IM: CPT | Mod: PBBFAC,PO

## 2019-07-02 PROCEDURE — 99213 PR OFFICE/OUTPT VISIT, EST, LEVL III, 20-29 MIN: ICD-10-PCS | Mod: S$PBB,TH,, | Performed by: OBSTETRICS & GYNECOLOGY

## 2019-07-02 RX ADMIN — HYDROXYPROGESTERONE CAPROATE 250 MG: 250 INJECTION INTRAMUSCULAR at 02:07

## 2019-07-02 NOTE — TELEPHONE ENCOUNTER
Spoke with Garcia - I currently have 7 pens and one vial for Tanisha.  She has missed multiple injection appointments.  She is scheduled again this afternoon.  We do not need any more medication at this time, but we will contact Garcia again if we need more before she is 36wga or delivered.

## 2019-07-02 NOTE — PROGRESS NOTES
25yo  at 27w5d.  H/o PTD, currently on 17-OHP but has missed multiple doses - has not had injection in 2 weeks.  States she forgets the appts.  No complaints today.  No ctx/vb/lof.  +FM.  Taking PNV.    A/P:  25yo  at 27w5d, insufficient PNC   - h/o PTD at 35w5d, currently on 17-OHP but noncompliant with weekly injections.  Will schedule home health for weekly injections.  Currently no s/sx of PTL.  Strict precautions given.  - prev c/s x 3, desires repeat.  Desires BTL.  Discussed LARC and risk of regret at a young age with BTL.  Pt declines, adamantly desires BLT.  Consents signed last visit.  - Counseling done, precautions given, all questions answered.  RTC next available for growth US; 2 wks for OB visit.

## 2019-07-03 NOTE — PROGRESS NOTES
7/2/2019 @ 1459 Pt administered Niurka 250 mg / ml IM to the Left upper outer glut. Pt tolerated well.  Pt instructed to wait 15 min in waiting room following injection, Pt verbalizes understanding.    Lot#[id number]  Exp Date:10/20

## 2019-07-23 ENCOUNTER — PROCEDURE VISIT (OUTPATIENT)
Dept: OBSTETRICS AND GYNECOLOGY | Facility: CLINIC | Age: 24
End: 2019-07-23
Payer: MEDICAID

## 2019-07-23 DIAGNOSIS — O09.32 INSUFFICIENT PRENATAL CARE IN SECOND TRIMESTER: ICD-10-CM

## 2019-07-23 PROCEDURE — 99499 NO LOS: ICD-10-PCS | Mod: S$PBB,,, | Performed by: OBSTETRICS & GYNECOLOGY

## 2019-07-23 PROCEDURE — 76816 OB US FOLLOW-UP PER FETUS: CPT | Mod: PBBFAC,PO | Performed by: OBSTETRICS & GYNECOLOGY

## 2019-07-23 PROCEDURE — 76816 PR  US,PREGNANT UTERUS,F/U,TRANSABD APP: ICD-10-PCS | Mod: 26,S$PBB,, | Performed by: OBSTETRICS & GYNECOLOGY

## 2019-07-23 PROCEDURE — 99499 UNLISTED E&M SERVICE: CPT | Mod: S$PBB,,, | Performed by: OBSTETRICS & GYNECOLOGY

## 2019-07-23 PROCEDURE — 76816 OB US FOLLOW-UP PER FETUS: CPT | Mod: 26,S$PBB,, | Performed by: OBSTETRICS & GYNECOLOGY

## 2019-07-30 ENCOUNTER — TELEPHONE (OUTPATIENT)
Dept: OBSTETRICS AND GYNECOLOGY | Facility: CLINIC | Age: 24
End: 2019-07-30

## 2019-07-30 NOTE — TELEPHONE ENCOUNTER
----- Message from Britni Harris sent at 7/30/2019 12:19 PM CDT -----  Contact: [phone]/self  Pt states she received a letter from her insurance company, [employer], stating Dr. Mcmahon will no longer be her dr. She wants to know if she should still come in today. Please call and advise.

## 2019-08-19 ENCOUNTER — TELEPHONE (OUTPATIENT)
Dept: OBSTETRICS AND GYNECOLOGY | Facility: CLINIC | Age: 24
End: 2019-08-19

## 2019-08-20 ENCOUNTER — CLINICAL SUPPORT (OUTPATIENT)
Dept: OBSTETRICS AND GYNECOLOGY | Facility: CLINIC | Age: 24
End: 2019-08-20
Payer: MEDICAID

## 2019-08-20 ENCOUNTER — ROUTINE PRENATAL (OUTPATIENT)
Dept: OBSTETRICS AND GYNECOLOGY | Facility: CLINIC | Age: 24
End: 2019-08-20
Payer: MEDICAID

## 2019-08-20 VITALS — DIASTOLIC BLOOD PRESSURE: 60 MMHG | BODY MASS INDEX: 46.93 KG/M2 | SYSTOLIC BLOOD PRESSURE: 120 MMHG | WEIGHT: 282 LBS

## 2019-08-20 DIAGNOSIS — O09.892 HISTORY OF PRETERM DELIVERY, CURRENTLY PREGNANT IN SECOND TRIMESTER: ICD-10-CM

## 2019-08-20 DIAGNOSIS — O09.32 INSUFFICIENT PRENATAL CARE IN SECOND TRIMESTER: Primary | ICD-10-CM

## 2019-08-20 DIAGNOSIS — O34.219 PREVIOUS CESAREAN SECTION COMPLICATING PREGNANCY: ICD-10-CM

## 2019-08-20 PROCEDURE — 99212 OFFICE O/P EST SF 10 MIN: CPT | Mod: PBBFAC,25,TH,PO | Performed by: OBSTETRICS & GYNECOLOGY

## 2019-08-20 PROCEDURE — 99214 OFFICE O/P EST MOD 30 MIN: CPT | Mod: S$PBB,TH,, | Performed by: OBSTETRICS & GYNECOLOGY

## 2019-08-20 PROCEDURE — 96372 THER/PROPH/DIAG INJ SC/IM: CPT | Mod: PBBFAC,PO

## 2019-08-20 PROCEDURE — 99999 PR PBB SHADOW E&M-EST. PATIENT-LVL II: CPT | Mod: PBBFAC,,, | Performed by: OBSTETRICS & GYNECOLOGY

## 2019-08-20 PROCEDURE — 99999 PR PBB SHADOW E&M-EST. PATIENT-LVL II: ICD-10-PCS | Mod: PBBFAC,,, | Performed by: OBSTETRICS & GYNECOLOGY

## 2019-08-20 PROCEDURE — 99214 PR OFFICE/OUTPT VISIT, EST, LEVL IV, 30-39 MIN: ICD-10-PCS | Mod: S$PBB,TH,, | Performed by: OBSTETRICS & GYNECOLOGY

## 2019-08-20 RX ORDER — HYDROXYPROGESTERONE CAPROATE 250 MG/ML
INJECTION SUBCUTANEOUS
Refills: 4 | Status: ON HOLD | COMMUNITY
Start: 2019-06-06 | End: 2019-09-19

## 2019-08-20 RX ADMIN — HYDROXYPROGESTERONE CAPROATE 250 MG: 250 INJECTION INTRAMUSCULAR at 11:08

## 2019-08-20 NOTE — PROGRESS NOTES
Verified patients first and last name, , and allergies  Administered [place] 250mg  IM INJ in RD  Patient tolerated well  Pt supplied

## 2019-08-26 NOTE — PROGRESS NOTES
25yo  at 35w4d - last seen at 27wga.  H/o PTD, currently on 17-OHP but has missed multiple doses.  No complaints today, except occ irregular ctx.  No vb/lof.  +FM.  Taking PNV.    A/P:  25yo  at 35w4d, insufficient PNC   - h/o PTD at 35w5d, currently on 17-OHP but noncompliant with weekly injections.  Currently no s/sx of PTL.  Strict precautions given.  - prev c/s x 3, desires repeat.  Desires BTL.  Discussed LARC and risk of regret at a young age with BTL.  Pt declines, adamantly desires BLT.  Consents signed previously.  - Counseling done, precautions given, all questions answered.  RTC 1 wk for OB visit and 3rd trimester labs/cx.

## 2019-08-27 ENCOUNTER — ROUTINE PRENATAL (OUTPATIENT)
Dept: OBSTETRICS AND GYNECOLOGY | Facility: CLINIC | Age: 24
End: 2019-08-27
Payer: MEDICAID

## 2019-08-27 ENCOUNTER — HOSPITAL ENCOUNTER (OUTPATIENT)
Dept: OBSTETRICS AND GYNECOLOGY | Facility: HOSPITAL | Age: 24
Discharge: HOME | End: 2019-08-27
Attending: OBSTETRICS & GYNECOLOGY
Payer: MEDICAID

## 2019-08-27 ENCOUNTER — CLINICAL SUPPORT (OUTPATIENT)
Dept: OBSTETRICS AND GYNECOLOGY | Facility: CLINIC | Age: 24
End: 2019-08-27
Payer: MEDICAID

## 2019-08-27 VITALS
BODY MASS INDEX: 48.62 KG/M2 | SYSTOLIC BLOOD PRESSURE: 122 MMHG | DIASTOLIC BLOOD PRESSURE: 74 MMHG | WEIGHT: 292.19 LBS

## 2019-08-27 DIAGNOSIS — O34.219 PREVIOUS CESAREAN SECTION COMPLICATING PREGNANCY: ICD-10-CM

## 2019-08-27 DIAGNOSIS — O09.892 HISTORY OF PRETERM DELIVERY, CURRENTLY PREGNANT IN SECOND TRIMESTER: ICD-10-CM

## 2019-08-27 DIAGNOSIS — O09.32 INSUFFICIENT PRENATAL CARE IN SECOND TRIMESTER: Primary | ICD-10-CM

## 2019-08-27 PROCEDURE — 99214 OFFICE O/P EST MOD 30 MIN: CPT | Mod: S$PBB,TH,, | Performed by: OBSTETRICS & GYNECOLOGY

## 2019-08-27 PROCEDURE — 87081 CULTURE SCREEN ONLY: CPT

## 2019-08-27 PROCEDURE — 87491 CHLMYD TRACH DNA AMP PROBE: CPT

## 2019-08-27 PROCEDURE — 87077 CULTURE AEROBIC IDENTIFY: CPT

## 2019-08-27 PROCEDURE — 99999 PR PBB SHADOW E&M-EST. PATIENT-LVL II: CPT | Mod: PBBFAC,,, | Performed by: OBSTETRICS & GYNECOLOGY

## 2019-08-27 PROCEDURE — 87088 URINE BACTERIA CULTURE: CPT

## 2019-08-27 PROCEDURE — 99999 PR PBB SHADOW E&M-EST. PATIENT-LVL II: ICD-10-PCS | Mod: PBBFAC,,, | Performed by: OBSTETRICS & GYNECOLOGY

## 2019-08-27 PROCEDURE — 99212 OFFICE O/P EST SF 10 MIN: CPT | Mod: PBBFAC,TH,PO | Performed by: OBSTETRICS & GYNECOLOGY

## 2019-08-27 PROCEDURE — 99214 PR OFFICE/OUTPT VISIT, EST, LEVL IV, 30-39 MIN: ICD-10-PCS | Mod: S$PBB,TH,, | Performed by: OBSTETRICS & GYNECOLOGY

## 2019-08-27 PROCEDURE — 87186 SC STD MICRODIL/AGAR DIL: CPT

## 2019-08-27 PROCEDURE — 96372 THER/PROPH/DIAG INJ SC/IM: CPT | Mod: PBBFAC,PO

## 2019-08-27 PROCEDURE — 87086 URINE CULTURE/COLONY COUNT: CPT

## 2019-08-27 RX ADMIN — HYDROXYPROGESTERONE CAPROATE 250 MG: 250 INJECTION INTRAMUSCULAR at 11:08

## 2019-08-27 NOTE — NURSING
OB Pre-Registration Done  C/S teaching/instructions given. Pt viewed flip chart & verbalized understanding.

## 2019-08-27 NOTE — PROGRESS NOTES
No complaints today.  Occ irreg ctx but no LOF or VB.  +FM.  H/o PTD, currently on 17-OHP but has missed multiple doses. Taking PNV.    A/P:  25yo  at 35w5d, insufficient PNC   - h/o PTD at 35w5d, currently on 17-OHP but noncompliant with weekly injections.  Currently no s/sx of PTL.  Strict precautions given.  - prev c/s x 3, desires repeat.  Desires BTL.  Discussed LARC and risk of regret at a young age with BTL.  Pt declines, adamantly desires BLT.  Consents signed previously.  - Continue daily PNV.  GBS/labs/cx pending today.  - Counseling done, precautions given, all questions answered.  RTC 1 wk for OB visit.

## 2019-08-27 NOTE — PROGRESS NOTES
Verified patients first and last name, , and allergies  Administered [place] 250mg  IM INJ in RA  Patient tolerated well  Pt supplied.

## 2019-08-29 ENCOUNTER — TELEPHONE (OUTPATIENT)
Dept: OBSTETRICS AND GYNECOLOGY | Facility: HOSPITAL | Age: 24
End: 2019-08-29

## 2019-08-29 ENCOUNTER — TELEPHONE (OUTPATIENT)
Dept: OBSTETRICS AND GYNECOLOGY | Facility: CLINIC | Age: 24
End: 2019-08-29

## 2019-08-29 ENCOUNTER — ROUTINE PRENATAL (OUTPATIENT)
Dept: OBSTETRICS AND GYNECOLOGY | Facility: CLINIC | Age: 24
End: 2019-08-29
Payer: MEDICAID

## 2019-08-29 VITALS
SYSTOLIC BLOOD PRESSURE: 118 MMHG | WEIGHT: 289.88 LBS | DIASTOLIC BLOOD PRESSURE: 68 MMHG | BODY MASS INDEX: 48.24 KG/M2

## 2019-08-29 DIAGNOSIS — O09.892 HISTORY OF PRETERM DELIVERY, CURRENTLY PREGNANT IN SECOND TRIMESTER: ICD-10-CM

## 2019-08-29 DIAGNOSIS — O34.219 PREVIOUS CESAREAN SECTION COMPLICATING PREGNANCY: ICD-10-CM

## 2019-08-29 DIAGNOSIS — O09.32 INSUFFICIENT PRENATAL CARE IN SECOND TRIMESTER: Primary | ICD-10-CM

## 2019-08-29 LAB
C TRACH DNA SPEC QL NAA+PROBE: NOT DETECTED
N GONORRHOEA DNA SPEC QL NAA+PROBE: NOT DETECTED

## 2019-08-29 PROCEDURE — 99999 PR PBB SHADOW E&M-EST. PATIENT-LVL II: CPT | Mod: PBBFAC,,, | Performed by: OBSTETRICS & GYNECOLOGY

## 2019-08-29 PROCEDURE — 87086 URINE CULTURE/COLONY COUNT: CPT

## 2019-08-29 PROCEDURE — 99214 OFFICE O/P EST MOD 30 MIN: CPT | Mod: S$PBB,TH,, | Performed by: OBSTETRICS & GYNECOLOGY

## 2019-08-29 PROCEDURE — 99212 OFFICE O/P EST SF 10 MIN: CPT | Mod: PBBFAC,TH,PO | Performed by: OBSTETRICS & GYNECOLOGY

## 2019-08-29 PROCEDURE — 99214 PR OFFICE/OUTPT VISIT, EST, LEVL IV, 30-39 MIN: ICD-10-PCS | Mod: S$PBB,TH,, | Performed by: OBSTETRICS & GYNECOLOGY

## 2019-08-29 PROCEDURE — 99999 PR PBB SHADOW E&M-EST. PATIENT-LVL II: ICD-10-PCS | Mod: PBBFAC,,, | Performed by: OBSTETRICS & GYNECOLOGY

## 2019-08-29 RX ORDER — NITROFURANTOIN 25; 75 MG/1; MG/1
100 CAPSULE ORAL 2 TIMES DAILY
Qty: 14 CAPSULE | Refills: 0 | Status: SHIPPED | OUTPATIENT
Start: 2019-08-29 | End: 2019-09-05

## 2019-08-29 NOTE — TELEPHONE ENCOUNTER
Please let the patient know her cultures show she has a urinary tract infection.  I have sent in Macrobid for her.  Let me know if she has any questions.  Thank you.

## 2019-08-29 NOTE — TELEPHONE ENCOUNTER
----- Message from Yamilet García sent at 8/29/2019  8:32 AM CDT -----  Contact: Self [phone]  Patient would like to speak with you about having another lump on her left arm and it is red and very painful. Please advise

## 2019-08-30 LAB
BACTERIA SPEC AEROBE CULT: NORMAL
BACTERIA UR CULT: ABNORMAL

## 2019-09-01 LAB
BACTERIA UR CULT: NORMAL
BACTERIA UR CULT: NORMAL

## 2019-09-03 ENCOUNTER — CLINICAL SUPPORT (OUTPATIENT)
Dept: OBSTETRICS AND GYNECOLOGY | Facility: CLINIC | Age: 24
End: 2019-09-03
Payer: MEDICAID

## 2019-09-03 ENCOUNTER — ROUTINE PRENATAL (OUTPATIENT)
Dept: OBSTETRICS AND GYNECOLOGY | Facility: CLINIC | Age: 24
End: 2019-09-03
Payer: MEDICAID

## 2019-09-03 VITALS
WEIGHT: 289.13 LBS | BODY MASS INDEX: 48.11 KG/M2 | DIASTOLIC BLOOD PRESSURE: 76 MMHG | SYSTOLIC BLOOD PRESSURE: 112 MMHG

## 2019-09-03 DIAGNOSIS — O34.219 PREVIOUS CESAREAN SECTION COMPLICATING PREGNANCY: ICD-10-CM

## 2019-09-03 DIAGNOSIS — R31.9 HEMATURIA, UNSPECIFIED TYPE: ICD-10-CM

## 2019-09-03 DIAGNOSIS — O09.893 HISTORY OF PRETERM DELIVERY, CURRENTLY PREGNANT IN THIRD TRIMESTER: Primary | ICD-10-CM

## 2019-09-03 PROCEDURE — 99999 PR PBB SHADOW E&M-EST. PATIENT-LVL II: CPT | Mod: PBBFAC,,, | Performed by: OBSTETRICS & GYNECOLOGY

## 2019-09-03 PROCEDURE — 96372 THER/PROPH/DIAG INJ SC/IM: CPT | Mod: PBBFAC,PO

## 2019-09-03 PROCEDURE — 99214 OFFICE O/P EST MOD 30 MIN: CPT | Mod: S$PBB,TH,, | Performed by: OBSTETRICS & GYNECOLOGY

## 2019-09-03 PROCEDURE — 99212 OFFICE O/P EST SF 10 MIN: CPT | Mod: PBBFAC,25,TH,PO | Performed by: OBSTETRICS & GYNECOLOGY

## 2019-09-03 PROCEDURE — 87086 URINE CULTURE/COLONY COUNT: CPT

## 2019-09-03 PROCEDURE — 87077 CULTURE AEROBIC IDENTIFY: CPT

## 2019-09-03 PROCEDURE — 87088 URINE BACTERIA CULTURE: CPT

## 2019-09-03 PROCEDURE — 87186 SC STD MICRODIL/AGAR DIL: CPT

## 2019-09-03 PROCEDURE — 99999 PR PBB SHADOW E&M-EST. PATIENT-LVL II: ICD-10-PCS | Mod: PBBFAC,,, | Performed by: OBSTETRICS & GYNECOLOGY

## 2019-09-03 PROCEDURE — 99214 PR OFFICE/OUTPT VISIT, EST, LEVL IV, 30-39 MIN: ICD-10-PCS | Mod: S$PBB,TH,, | Performed by: OBSTETRICS & GYNECOLOGY

## 2019-09-03 RX ADMIN — HYDROXYPROGESTERONE CAPROATE 250 MG: 250 INJECTION INTRAMUSCULAR at 10:09

## 2019-09-05 ENCOUNTER — HOSPITAL ENCOUNTER (OUTPATIENT)
Facility: HOSPITAL | Age: 24
LOS: 1 days | Discharge: HOME | End: 2019-09-05
Attending: OBSTETRICS & GYNECOLOGY | Admitting: OBSTETRICS & GYNECOLOGY
Payer: MEDICAID

## 2019-09-05 ENCOUNTER — TELEPHONE (OUTPATIENT)
Dept: OBSTETRICS AND GYNECOLOGY | Facility: CLINIC | Age: 24
End: 2019-09-05

## 2019-09-05 ENCOUNTER — ANESTHESIA EVENT (OUTPATIENT)
Dept: OBSTETRICS AND GYNECOLOGY | Facility: HOSPITAL | Age: 24
End: 2019-09-05
Payer: MEDICAID

## 2019-09-05 VITALS
SYSTOLIC BLOOD PRESSURE: 113 MMHG | BODY MASS INDEX: 48.12 KG/M2 | WEIGHT: 288.81 LBS | HEIGHT: 65 IN | HEART RATE: 80 BPM | TEMPERATURE: 97 F | OXYGEN SATURATION: 99 % | DIASTOLIC BLOOD PRESSURE: 58 MMHG

## 2019-09-05 DIAGNOSIS — O36.8190 DECREASED FETAL MOVEMENT: ICD-10-CM

## 2019-09-05 LAB
BASOPHILS # BLD AUTO: 0.03 K/UL (ref 0–0.2)
BASOPHILS NFR BLD: 0.2 % (ref 0–1.9)
BILIRUB UR QL STRIP: NEGATIVE
CLARITY UR: CLEAR
COLOR UR: YELLOW
DIFFERENTIAL METHOD: ABNORMAL
EOSINOPHIL # BLD AUTO: 0.4 K/UL (ref 0–0.5)
EOSINOPHIL NFR BLD: 2.8 % (ref 0–8)
ERYTHROCYTE [DISTWIDTH] IN BLOOD BY AUTOMATED COUNT: 12.8 % (ref 11.5–14.5)
GLUCOSE UR QL STRIP: NEGATIVE
HCT VFR BLD AUTO: 38.9 % (ref 37–48.5)
HGB BLD-MCNC: 13.1 G/DL (ref 12–16)
HGB UR QL STRIP: NEGATIVE
KETONES UR QL STRIP: NEGATIVE
LEUKOCYTE ESTERASE UR QL STRIP: NEGATIVE
LYMPHOCYTES # BLD AUTO: 2.5 K/UL (ref 1–4.8)
LYMPHOCYTES NFR BLD: 20.3 % (ref 18–48)
MCH RBC QN AUTO: 29 PG (ref 27–31)
MCHC RBC AUTO-ENTMCNC: 33.7 G/DL (ref 32–36)
MCV RBC AUTO: 86 FL (ref 82–98)
MONOCYTES # BLD AUTO: 0.8 K/UL (ref 0.3–1)
MONOCYTES NFR BLD: 6.6 % (ref 4–15)
NEUTROPHILS # BLD AUTO: 8.5 K/UL (ref 1.8–7.7)
NEUTROPHILS NFR BLD: 70.1 % (ref 38–73)
NITRITE UR QL STRIP: NEGATIVE
PH UR STRIP: 6 [PH] (ref 5–8)
PLATELET # BLD AUTO: 307 K/UL (ref 150–350)
PMV BLD AUTO: 10.1 FL (ref 9.2–12.9)
PROT UR QL STRIP: NEGATIVE
RBC # BLD AUTO: 4.52 M/UL (ref 4–5.4)
SP GR UR STRIP: 1.02 (ref 1–1.03)
URN SPEC COLLECT METH UR: NORMAL
UROBILINOGEN UR STRIP-ACNC: NEGATIVE EU/DL
WBC # BLD AUTO: 12.31 K/UL (ref 3.9–12.7)

## 2019-09-05 PROCEDURE — 99211 OFF/OP EST MAY X REQ PHY/QHP: CPT | Mod: 25

## 2019-09-05 PROCEDURE — 59025 FETAL NON-STRESS TEST: CPT

## 2019-09-05 PROCEDURE — 85025 COMPLETE CBC W/AUTO DIFF WBC: CPT

## 2019-09-05 PROCEDURE — 63600175 PHARM REV CODE 636 W HCPCS: Performed by: OBSTETRICS & GYNECOLOGY

## 2019-09-05 PROCEDURE — 81003 URINALYSIS AUTO W/O SCOPE: CPT

## 2019-09-05 PROCEDURE — 25000003 PHARM REV CODE 250: Performed by: OBSTETRICS & GYNECOLOGY

## 2019-09-05 PROCEDURE — 36415 COLL VENOUS BLD VENIPUNCTURE: CPT

## 2019-09-05 RX ORDER — ACETAMINOPHEN 500 MG
500 TABLET ORAL EVERY 6 HOURS PRN
Status: DISCONTINUED | OUTPATIENT
Start: 2019-09-05 | End: 2019-09-05 | Stop reason: HOSPADM

## 2019-09-05 RX ORDER — SODIUM CHLORIDE, SODIUM LACTATE, POTASSIUM CHLORIDE, CALCIUM CHLORIDE 600; 310; 30; 20 MG/100ML; MG/100ML; MG/100ML; MG/100ML
INJECTION, SOLUTION INTRAVENOUS CONTINUOUS
Status: DISCONTINUED | OUTPATIENT
Start: 2019-09-05 | End: 2019-09-05 | Stop reason: HOSPADM

## 2019-09-05 RX ORDER — ONDANSETRON 8 MG/1
8 TABLET, ORALLY DISINTEGRATING ORAL EVERY 8 HOURS PRN
Status: DISCONTINUED | OUTPATIENT
Start: 2019-09-05 | End: 2019-09-05 | Stop reason: HOSPADM

## 2019-09-05 RX ADMIN — ACETAMINOPHEN 500 MG: 500 TABLET ORAL at 12:09

## 2019-09-05 RX ADMIN — SODIUM CHLORIDE, SODIUM LACTATE, POTASSIUM CHLORIDE, AND CALCIUM CHLORIDE 1000 ML: .6; .31; .03; .02 INJECTION, SOLUTION INTRAVENOUS at 12:09

## 2019-09-05 RX ADMIN — SODIUM CHLORIDE, SODIUM LACTATE, POTASSIUM CHLORIDE, AND CALCIUM CHLORIDE: .6; .31; .03; .02 INJECTION, SOLUTION INTRAVENOUS at 01:09

## 2019-09-05 RX ADMIN — ONDANSETRON 8 MG: 8 TABLET, ORALLY DISINTEGRATING ORAL at 12:09

## 2019-09-05 NOTE — DISCHARGE INSTRUCTIONS
Follow Labor Precautions:  Call MD or return to Labor and Delivery if...    Labor (after 36 weeks)  - Painful contractions every 5 minutes for 2 hours that do not go away with 2 bottles of water, 2 tylenol, and rest.      Labor (before 36 weeks)  - More than 4 contractions in 1 hour   -First try 2 bottles of water, rest, and 2 tylenol.... If the contractions do not go away call doctors office or after hours clinic first and/or come to hospital for evaluation.      Water Breaks  - Gush or leaking of fluid from vagina, or if unsure.     Vaginal bleeding  - Bright red bleeding like a period, soaking a pad in 1 hour.    Decreased or No fetal movement  - You should feel 10 movements within two hours.  -If you are not feeling the baby move.... Drink a glass of orange juice or apple juice  - If you DO NOT feel 10 movements within two hours, please  call the MD or come to the hospital.    Unable to keep fluids or food down for more than 24 hours    Blurred vision, spots before your eyes, dizziness, headache that does not get better with Tylenol (Acetaminophen), bad swelling, chest pain, or trouble breathing.    Drink 10-12 glasses of water daily  Take medications as prescribed  Keep all follow-up appointmentsnext  appt

## 2019-09-05 NOTE — TELEPHONE ENCOUNTER
----- Message from Rosalie Morrison sent at 9/5/2019  8:34 AM CDT -----  Contact: Patient  Patient called to speak with the nurse in regard to sharp stomach pains that started on yesterday afternoon around 12pm and had her sweating.   She can no longer feel the baby move and last movement was 5:30 pm last night.    Tried to get patient to Labor and Delivery but she disconnected call.

## 2019-09-05 NOTE — NURSING
DR Mcmahon return  Call aware  bpp 8-8  zofran and tylenol given  No pain at this time ,,, ve same 1 40 -3.. No blood on glove   Gave  Juice   No n-v  May be discharge... With  Written  Inst...

## 2019-09-05 NOTE — NURSING
24 year old G 4 P 3 at 37 weeks received to dec featl mvt since last night   Also  n-v abd pain 7-10. C/O  since  History of  . none vaginal bleeding, none leaking fluid. Fetus nomv Contractions not feeling. Abdomen soft. Educated on electronic fetal monitoring and assessments. Questions answered. Will update MD.   Exp all orders   Up to void at this time

## 2019-09-06 LAB — BACTERIA UR CULT: ABNORMAL

## 2019-09-16 NOTE — PROGRESS NOTES
C/o pain and swelling at [place] injection site from 2 days ago.  Denies fevers/chills.  No other complaints today.  No ctx/vb/lof.  +FM.  H/o PTD, currently on 17-OHP but has missed multiple doses. Taking PNV.    PE: Right upper extremity with swelling noted along posterior aspect.  Slightly tender to touch.  Full range of motion.      A/P:  25yo  at 36w0d, insufficient PNC   - Recommend ice and compression.  Spoke with magno rep - recommend injections more superior/cephalad, closer to shoulder with next injection.   - h/o PTD at 35w5d, currently on 17-OHP but noncompliant with weekly injections.  Currently no s/sx of PTL.  Strict precautions given.  - prev c/s x 3, desires repeat.  Desires BTL.  Discussed LARC and risk of regret at a young age with BTL.  Pt declines, adamantly desires BLT.  Consents signed previously.  RCD/BTL scheduled  at 9am.  - Continue daily PNV.   - Counseling done, precautions given, all questions answered.  RTC as scheduled.

## 2019-09-19 ENCOUNTER — ANESTHESIA (OUTPATIENT)
Dept: OBSTETRICS AND GYNECOLOGY | Facility: HOSPITAL | Age: 24
End: 2019-09-19
Payer: MEDICAID

## 2019-09-19 ENCOUNTER — HOSPITAL ENCOUNTER (INPATIENT)
Facility: HOSPITAL | Age: 24
LOS: 2 days | Discharge: HOME | End: 2019-09-21
Attending: OBSTETRICS & GYNECOLOGY | Admitting: OBSTETRICS & GYNECOLOGY
Payer: MEDICAID

## 2019-09-19 DIAGNOSIS — Z30.2 ENCOUNTER FOR STERILIZATION: ICD-10-CM

## 2019-09-19 DIAGNOSIS — Z98.891 S/P CESAREAN SECTION: Primary | ICD-10-CM

## 2019-09-19 DIAGNOSIS — Z3A.39 39 WEEKS GESTATION OF PREGNANCY: ICD-10-CM

## 2019-09-19 PROBLEM — O36.8190 DECREASED FETAL MOVEMENT: Status: RESOLVED | Noted: 2019-09-05 | Resolved: 2019-09-19

## 2019-09-19 LAB
ABO + RH BLD: NORMAL
BASOPHILS # BLD AUTO: 0.02 K/UL (ref 0–0.2)
BASOPHILS NFR BLD: 0.2 % (ref 0–1.9)
BLD GP AB SCN CELLS X3 SERPL QL: NORMAL
DIFFERENTIAL METHOD: NORMAL
EOSINOPHIL # BLD AUTO: 0.2 K/UL (ref 0–0.5)
EOSINOPHIL NFR BLD: 1.9 % (ref 0–8)
ERYTHROCYTE [DISTWIDTH] IN BLOOD BY AUTOMATED COUNT: 12.8 % (ref 11.5–14.5)
HCT VFR BLD AUTO: 38 % (ref 37–48.5)
HGB BLD-MCNC: 12.7 G/DL (ref 12–16)
LYMPHOCYTES # BLD AUTO: 2.3 K/UL (ref 1–4.8)
LYMPHOCYTES NFR BLD: 26.4 % (ref 18–48)
MCH RBC QN AUTO: 28.7 PG (ref 27–31)
MCHC RBC AUTO-ENTMCNC: 33.4 G/DL (ref 32–36)
MCV RBC AUTO: 86 FL (ref 82–98)
MONOCYTES # BLD AUTO: 0.9 K/UL (ref 0.3–1)
MONOCYTES NFR BLD: 9.7 % (ref 4–15)
NEUTROPHILS # BLD AUTO: 5.4 K/UL (ref 1.8–7.7)
NEUTROPHILS NFR BLD: 61.8 % (ref 38–73)
PLATELET # BLD AUTO: 313 K/UL (ref 150–350)
PMV BLD AUTO: 10.4 FL (ref 9.2–12.9)
RBC # BLD AUTO: 4.42 M/UL (ref 4–5.4)
WBC # BLD AUTO: 8.75 K/UL (ref 3.9–12.7)

## 2019-09-19 PROCEDURE — 25000003 PHARM REV CODE 250: Performed by: OBSTETRICS & GYNECOLOGY

## 2019-09-19 PROCEDURE — 63600175 PHARM REV CODE 636 W HCPCS: Performed by: INTERNAL MEDICINE

## 2019-09-19 PROCEDURE — 51702 INSERT TEMP BLADDER CATH: CPT

## 2019-09-19 PROCEDURE — 11000001 HC ACUTE MED/SURG PRIVATE ROOM

## 2019-09-19 PROCEDURE — 88302 TISSUE EXAM BY PATHOLOGIST: CPT | Performed by: PATHOLOGY

## 2019-09-19 PROCEDURE — 59514 PR CESAREAN DELIVERY ONLY: ICD-10-PCS | Mod: GB,,, | Performed by: OBSTETRICS & GYNECOLOGY

## 2019-09-19 PROCEDURE — 63600175 PHARM REV CODE 636 W HCPCS: Performed by: OBSTETRICS & GYNECOLOGY

## 2019-09-19 PROCEDURE — 62322 NJX INTERLAMINAR LMBR/SAC: CPT | Performed by: STUDENT IN AN ORGANIZED HEALTH CARE EDUCATION/TRAINING PROGRAM

## 2019-09-19 PROCEDURE — 88302 TISSUE EXAM BY PATHOLOGIST: CPT | Mod: 26,,, | Performed by: PATHOLOGY

## 2019-09-19 PROCEDURE — 63600175 PHARM REV CODE 636 W HCPCS: Performed by: STUDENT IN AN ORGANIZED HEALTH CARE EDUCATION/TRAINING PROGRAM

## 2019-09-19 PROCEDURE — 37000009 HC ANESTHESIA EA ADD 15 MINS: Performed by: OBSTETRICS & GYNECOLOGY

## 2019-09-19 PROCEDURE — 86850 RBC ANTIBODY SCREEN: CPT

## 2019-09-19 PROCEDURE — S0028 INJECTION, FAMOTIDINE, 20 MG: HCPCS

## 2019-09-19 PROCEDURE — 58611 PR LIGATION,FALLOPIAN TUBE W/C-SECTION: ICD-10-PCS | Mod: ,,, | Performed by: OBSTETRICS & GYNECOLOGY

## 2019-09-19 PROCEDURE — 88302 TISSUE SPECIMEN TO PATHOLOGY - SURGERY: ICD-10-PCS | Mod: 26,,, | Performed by: PATHOLOGY

## 2019-09-19 PROCEDURE — 85025 COMPLETE CBC W/AUTO DIFF WBC: CPT

## 2019-09-19 PROCEDURE — 59514 CESAREAN DELIVERY ONLY: CPT | Mod: GB,,, | Performed by: OBSTETRICS & GYNECOLOGY

## 2019-09-19 PROCEDURE — 36000680 HC C/S TUBAL LIGATION LEVEL I

## 2019-09-19 PROCEDURE — 27201121 HC TRAY,SPINAL-HYPERBARIC: Performed by: STUDENT IN AN ORGANIZED HEALTH CARE EDUCATION/TRAINING PROGRAM

## 2019-09-19 PROCEDURE — 25000003 PHARM REV CODE 250

## 2019-09-19 PROCEDURE — 63600175 PHARM REV CODE 636 W HCPCS: Performed by: ANESTHESIOLOGY

## 2019-09-19 PROCEDURE — 25000003 PHARM REV CODE 250: Performed by: STUDENT IN AN ORGANIZED HEALTH CARE EDUCATION/TRAINING PROGRAM

## 2019-09-19 PROCEDURE — 86592 SYPHILIS TEST NON-TREP QUAL: CPT

## 2019-09-19 PROCEDURE — 58611 LIGATE OVIDUCT(S) ADD-ON: CPT | Mod: ,,, | Performed by: OBSTETRICS & GYNECOLOGY

## 2019-09-19 PROCEDURE — 37000008 HC ANESTHESIA 1ST 15 MINUTES: Performed by: OBSTETRICS & GYNECOLOGY

## 2019-09-19 RX ORDER — HYDROCORTISONE 25 MG/G
CREAM TOPICAL 3 TIMES DAILY PRN
Status: DISCONTINUED | OUTPATIENT
Start: 2019-09-19 | End: 2019-09-21 | Stop reason: HOSPADM

## 2019-09-19 RX ORDER — OXYTOCIN/RINGER'S LACTATE 30/500 ML
95 PLASTIC BAG, INJECTION (ML) INTRAVENOUS ONCE
Status: DISCONTINUED | OUTPATIENT
Start: 2019-09-19 | End: 2019-09-21 | Stop reason: HOSPADM

## 2019-09-19 RX ORDER — ONDANSETRON 2 MG/ML
4 INJECTION INTRAMUSCULAR; INTRAVENOUS EVERY 6 HOURS PRN
Status: DISCONTINUED | OUTPATIENT
Start: 2019-09-19 | End: 2019-09-19 | Stop reason: SDUPTHER

## 2019-09-19 RX ORDER — KETOROLAC TROMETHAMINE 30 MG/ML
30 INJECTION, SOLUTION INTRAMUSCULAR; INTRAVENOUS EVERY 6 HOURS
Status: DISPENSED | OUTPATIENT
Start: 2019-09-19 | End: 2019-09-20

## 2019-09-19 RX ORDER — MORPHINE SULFATE 0.5 MG/ML
INJECTION, SOLUTION EPIDURAL; INTRATHECAL; INTRAVENOUS
Status: COMPLETED | OUTPATIENT
Start: 2019-09-19 | End: 2019-09-19

## 2019-09-19 RX ORDER — OXYCODONE HYDROCHLORIDE 5 MG/1
5 TABLET ORAL EVERY 4 HOURS PRN
Status: DISCONTINUED | OUTPATIENT
Start: 2019-09-19 | End: 2019-09-19 | Stop reason: SDUPTHER

## 2019-09-19 RX ORDER — SODIUM CHLORIDE, SODIUM LACTATE, POTASSIUM CHLORIDE, CALCIUM CHLORIDE 600; 310; 30; 20 MG/100ML; MG/100ML; MG/100ML; MG/100ML
INJECTION, SOLUTION INTRAVENOUS CONTINUOUS
Status: DISCONTINUED | OUTPATIENT
Start: 2019-09-19 | End: 2019-09-21 | Stop reason: HOSPADM

## 2019-09-19 RX ORDER — ONDANSETRON 2 MG/ML
4 INJECTION INTRAMUSCULAR; INTRAVENOUS EVERY 6 HOURS PRN
Status: DISCONTINUED | OUTPATIENT
Start: 2019-09-19 | End: 2019-09-21 | Stop reason: HOSPADM

## 2019-09-19 RX ORDER — SODIUM CHLORIDE, SODIUM LACTATE, POTASSIUM CHLORIDE, CALCIUM CHLORIDE 600; 310; 30; 20 MG/100ML; MG/100ML; MG/100ML; MG/100ML
INJECTION, SOLUTION INTRAVENOUS CONTINUOUS
Status: DISCONTINUED | OUTPATIENT
Start: 2019-09-19 | End: 2019-09-19

## 2019-09-19 RX ORDER — MORPHINE SULFATE 4 MG/ML
2 INJECTION, SOLUTION INTRAMUSCULAR; INTRAVENOUS
Status: DISCONTINUED | OUTPATIENT
Start: 2019-09-19 | End: 2019-09-19

## 2019-09-19 RX ORDER — OXYCODONE HYDROCHLORIDE 5 MG/1
10 TABLET ORAL EVERY 4 HOURS PRN
Status: DISCONTINUED | OUTPATIENT
Start: 2019-09-19 | End: 2019-09-21 | Stop reason: HOSPADM

## 2019-09-19 RX ORDER — MUPIROCIN 20 MG/G
1 OINTMENT TOPICAL 2 TIMES DAILY
Status: DISCONTINUED | OUTPATIENT
Start: 2019-09-19 | End: 2019-09-21 | Stop reason: HOSPADM

## 2019-09-19 RX ORDER — SIMETHICONE 80 MG
150 TABLET,CHEWABLE ORAL
Status: DISCONTINUED | OUTPATIENT
Start: 2019-09-19 | End: 2019-09-21 | Stop reason: HOSPADM

## 2019-09-19 RX ORDER — SODIUM CITRATE AND CITRIC ACID MONOHYDRATE 334; 500 MG/5ML; MG/5ML
30 SOLUTION ORAL ONCE
Status: DISCONTINUED | OUTPATIENT
Start: 2019-09-19 | End: 2019-09-19

## 2019-09-19 RX ORDER — IBUPROFEN 400 MG/1
800 TABLET ORAL EVERY 8 HOURS
Status: DISCONTINUED | OUTPATIENT
Start: 2019-09-20 | End: 2019-09-21 | Stop reason: HOSPADM

## 2019-09-19 RX ORDER — OXYCODONE AND ACETAMINOPHEN 5; 325 MG/1; MG/1
1 TABLET ORAL EVERY 4 HOURS PRN
Status: DISCONTINUED | OUTPATIENT
Start: 2019-09-19 | End: 2019-09-21 | Stop reason: HOSPADM

## 2019-09-19 RX ORDER — MISOPROSTOL 200 UG/1
200 TABLET ORAL ONCE AS NEEDED
Status: DISCONTINUED | OUTPATIENT
Start: 2019-09-19 | End: 2019-09-21 | Stop reason: HOSPADM

## 2019-09-19 RX ORDER — FENTANYL CITRATE 50 UG/ML
INJECTION, SOLUTION INTRAMUSCULAR; INTRAVENOUS
Status: DISCONTINUED | OUTPATIENT
Start: 2019-09-19 | End: 2019-09-19

## 2019-09-19 RX ORDER — ONDANSETRON 8 MG/1
8 TABLET, ORALLY DISINTEGRATING ORAL EVERY 8 HOURS PRN
Status: DISCONTINUED | OUTPATIENT
Start: 2019-09-19 | End: 2019-09-21 | Stop reason: HOSPADM

## 2019-09-19 RX ORDER — FAMOTIDINE 10 MG/ML
20 INJECTION INTRAVENOUS ONCE
Status: COMPLETED | OUTPATIENT
Start: 2019-09-19 | End: 2019-09-19

## 2019-09-19 RX ORDER — OXYCODONE HYDROCHLORIDE 5 MG/1
10 TABLET ORAL EVERY 4 HOURS PRN
Status: DISCONTINUED | OUTPATIENT
Start: 2019-09-19 | End: 2019-09-19

## 2019-09-19 RX ORDER — OXYCODONE HYDROCHLORIDE 5 MG/1
5 TABLET ORAL EVERY 4 HOURS PRN
Status: DISCONTINUED | OUTPATIENT
Start: 2019-09-19 | End: 2019-09-21 | Stop reason: HOSPADM

## 2019-09-19 RX ORDER — BUPIVACAINE HYDROCHLORIDE 7.5 MG/ML
INJECTION, SOLUTION EPIDURAL; RETROBULBAR
Status: COMPLETED | OUTPATIENT
Start: 2019-09-19 | End: 2019-09-19

## 2019-09-19 RX ORDER — OXYTOCIN 10 [USP'U]/ML
INJECTION, SOLUTION INTRAMUSCULAR; INTRAVENOUS
Status: DISCONTINUED | OUTPATIENT
Start: 2019-09-19 | End: 2019-09-19

## 2019-09-19 RX ORDER — PHENYLEPHRINE HYDROCHLORIDE 10 MG/ML
INJECTION INTRAVENOUS
Status: DISCONTINUED | OUTPATIENT
Start: 2019-09-19 | End: 2019-09-19

## 2019-09-19 RX ORDER — KETOROLAC TROMETHAMINE 30 MG/ML
30 INJECTION, SOLUTION INTRAMUSCULAR; INTRAVENOUS EVERY 6 HOURS
Status: DISCONTINUED | OUTPATIENT
Start: 2019-09-19 | End: 2019-09-19

## 2019-09-19 RX ORDER — OXYCODONE AND ACETAMINOPHEN 10; 325 MG/1; MG/1
1 TABLET ORAL EVERY 4 HOURS PRN
Status: DISCONTINUED | OUTPATIENT
Start: 2019-09-19 | End: 2019-09-21 | Stop reason: HOSPADM

## 2019-09-19 RX ORDER — MUPIROCIN 20 MG/G
OINTMENT TOPICAL
Status: DISCONTINUED | OUTPATIENT
Start: 2019-09-19 | End: 2019-09-19

## 2019-09-19 RX ORDER — ACETAMINOPHEN 325 MG/1
650 TABLET ORAL EVERY 6 HOURS PRN
Status: DISCONTINUED | OUTPATIENT
Start: 2019-09-19 | End: 2019-09-21 | Stop reason: HOSPADM

## 2019-09-19 RX ORDER — FAMOTIDINE 10 MG/ML
INJECTION INTRAVENOUS
Status: COMPLETED
Start: 2019-09-19 | End: 2019-09-19

## 2019-09-19 RX ORDER — SIMETHICONE 80 MG
1 TABLET,CHEWABLE ORAL EVERY 6 HOURS PRN
Status: DISCONTINUED | OUTPATIENT
Start: 2019-09-19 | End: 2019-09-21 | Stop reason: HOSPADM

## 2019-09-19 RX ORDER — ADHESIVE BANDAGE
30 BANDAGE TOPICAL 2 TIMES DAILY PRN
Status: DISCONTINUED | OUTPATIENT
Start: 2019-09-20 | End: 2019-09-21 | Stop reason: HOSPADM

## 2019-09-19 RX ORDER — ACETAMINOPHEN 325 MG/1
650 TABLET ORAL EVERY 6 HOURS
Status: DISCONTINUED | OUTPATIENT
Start: 2019-09-19 | End: 2019-09-19 | Stop reason: SDUPTHER

## 2019-09-19 RX ORDER — KETOROLAC TROMETHAMINE 30 MG/ML
30 INJECTION, SOLUTION INTRAMUSCULAR; INTRAVENOUS EVERY 6 HOURS
Status: DISCONTINUED | OUTPATIENT
Start: 2019-09-19 | End: 2019-09-19 | Stop reason: SDUPTHER

## 2019-09-19 RX ORDER — OXYTOCIN/RINGER'S LACTATE 30/500 ML
334 PLASTIC BAG, INJECTION (ML) INTRAVENOUS ONCE
Status: DISCONTINUED | OUTPATIENT
Start: 2019-09-19 | End: 2019-09-19

## 2019-09-19 RX ORDER — ACETAMINOPHEN 325 MG/1
650 TABLET ORAL EVERY 6 HOURS
Status: DISPENSED | OUTPATIENT
Start: 2019-09-19 | End: 2019-09-20

## 2019-09-19 RX ORDER — MORPHINE SULFATE 4 MG/ML
2 INJECTION, SOLUTION INTRAMUSCULAR; INTRAVENOUS
Status: ACTIVE | OUTPATIENT
Start: 2019-09-19 | End: 2019-09-20

## 2019-09-19 RX ORDER — BISACODYL 10 MG
10 SUPPOSITORY, RECTAL RECTAL ONCE AS NEEDED
Status: DISCONTINUED | OUTPATIENT
Start: 2019-09-19 | End: 2019-09-21 | Stop reason: HOSPADM

## 2019-09-19 RX ORDER — SODIUM CITRATE AND CITRIC ACID MONOHYDRATE 334; 500 MG/5ML; MG/5ML
30 SOLUTION ORAL
Status: DISCONTINUED | OUTPATIENT
Start: 2019-09-19 | End: 2019-09-19

## 2019-09-19 RX ORDER — ONDANSETRON HYDROCHLORIDE 2 MG/ML
INJECTION, SOLUTION INTRAMUSCULAR; INTRAVENOUS
Status: DISCONTINUED | OUTPATIENT
Start: 2019-09-19 | End: 2019-09-19

## 2019-09-19 RX ORDER — DOCUSATE SODIUM 100 MG/1
200 CAPSULE, LIQUID FILLED ORAL 2 TIMES DAILY
Status: DISCONTINUED | OUTPATIENT
Start: 2019-09-19 | End: 2019-09-21 | Stop reason: HOSPADM

## 2019-09-19 RX ORDER — MISOPROSTOL 200 UG/1
800 TABLET ORAL
Status: DISCONTINUED | OUTPATIENT
Start: 2019-09-19 | End: 2019-09-19

## 2019-09-19 RX ORDER — DIPHENHYDRAMINE HCL 25 MG
25 CAPSULE ORAL EVERY 4 HOURS PRN
Status: DISCONTINUED | OUTPATIENT
Start: 2019-09-19 | End: 2019-09-21 | Stop reason: HOSPADM

## 2019-09-19 RX ORDER — KETOROLAC TROMETHAMINE 30 MG/ML
INJECTION, SOLUTION INTRAMUSCULAR; INTRAVENOUS
Status: DISCONTINUED | OUTPATIENT
Start: 2019-09-19 | End: 2019-09-19

## 2019-09-19 RX ORDER — OXYTOCIN/RINGER'S LACTATE 30/500 ML
95 PLASTIC BAG, INJECTION (ML) INTRAVENOUS ONCE
Status: COMPLETED | OUTPATIENT
Start: 2019-09-19 | End: 2019-09-19

## 2019-09-19 RX ADMIN — PHENYLEPHRINE HYDROCHLORIDE 100 MCG: 10 INJECTION INTRAVENOUS at 11:09

## 2019-09-19 RX ADMIN — MUPIROCIN: 20 OINTMENT TOPICAL at 08:09

## 2019-09-19 RX ADMIN — KETOROLAC TROMETHAMINE 60 MG: 30 INJECTION, SOLUTION INTRAMUSCULAR; INTRAVENOUS at 12:09

## 2019-09-19 RX ADMIN — Medication 95 MILLI-UNITS/MIN: at 02:09

## 2019-09-19 RX ADMIN — SODIUM CHLORIDE, SODIUM LACTATE, POTASSIUM CHLORIDE, AND CALCIUM CHLORIDE 1000 ML: .6; .31; .03; .02 INJECTION, SOLUTION INTRAVENOUS at 08:09

## 2019-09-19 RX ADMIN — OXYTOCIN 20 UNITS: 10 INJECTION, SOLUTION INTRAMUSCULAR; INTRAVENOUS at 11:09

## 2019-09-19 RX ADMIN — ONDANSETRON 4 MG: 2 INJECTION, SOLUTION INTRAMUSCULAR; INTRAVENOUS at 11:09

## 2019-09-19 RX ADMIN — SODIUM CITRATE AND CITRIC ACID MONOHYDRATE 30 ML: 500; 334 SOLUTION ORAL at 08:09

## 2019-09-19 RX ADMIN — SODIUM CHLORIDE, SODIUM LACTATE, POTASSIUM CHLORIDE, AND CALCIUM CHLORIDE 1000 ML: .6; .31; .03; .02 INJECTION, SOLUTION INTRAVENOUS at 07:09

## 2019-09-19 RX ADMIN — MORPHINE SULFATE 0.2 MG: 0.5 INJECTION, SOLUTION EPIDURAL; INTRATHECAL; INTRAVENOUS at 11:09

## 2019-09-19 RX ADMIN — DEXTROSE 3 G: 50 INJECTION, SOLUTION INTRAVENOUS at 10:09

## 2019-09-19 RX ADMIN — KETOROLAC TROMETHAMINE 30 MG: 30 INJECTION, SOLUTION INTRAMUSCULAR at 10:09

## 2019-09-19 RX ADMIN — DOCUSATE SODIUM 200 MG: 100 CAPSULE, LIQUID FILLED ORAL at 10:09

## 2019-09-19 RX ADMIN — FENTANYL CITRATE 10 MCG: 50 INJECTION, SOLUTION INTRAMUSCULAR; INTRAVENOUS at 11:09

## 2019-09-19 RX ADMIN — ONDANSETRON 4 MG: 2 INJECTION INTRAMUSCULAR; INTRAVENOUS at 12:09

## 2019-09-19 RX ADMIN — OXYCODONE HYDROCHLORIDE AND ACETAMINOPHEN 1 TABLET: 10; 325 TABLET ORAL at 11:09

## 2019-09-19 RX ADMIN — SODIUM CHLORIDE, SODIUM LACTATE, POTASSIUM CHLORIDE, AND CALCIUM CHLORIDE: .6; .31; .03; .02 INJECTION, SOLUTION INTRAVENOUS at 10:09

## 2019-09-19 RX ADMIN — FAMOTIDINE 20 MG: 10 INJECTION, SOLUTION INTRAVENOUS at 08:09

## 2019-09-19 RX ADMIN — OXYTOCIN 10 UNITS: 10 INJECTION, SOLUTION INTRAMUSCULAR; INTRAVENOUS at 11:09

## 2019-09-19 RX ADMIN — BUPIVACAINE HYDROCHLORIDE 1.6 ML: 7.5 INJECTION, SOLUTION EPIDURAL; RETROBULBAR at 11:09

## 2019-09-19 RX ADMIN — PROMETHAZINE HYDROCHLORIDE 12.5 MG: 25 INJECTION INTRAMUSCULAR; INTRAVENOUS at 02:09

## 2019-09-19 RX ADMIN — ACETAMINOPHEN 650 MG: 325 TABLET ORAL at 10:09

## 2019-09-19 RX ADMIN — FAMOTIDINE 20 MG: 10 INJECTION INTRAVENOUS at 08:09

## 2019-09-19 RX ADMIN — MUPIROCIN 1 G: 20 OINTMENT TOPICAL at 10:09

## 2019-09-19 NOTE — ANESTHESIA PROCEDURE NOTES
Spinal    Diagnosis: IUP  Patient location during procedure: OB  Start time: 9/19/2019 10:58 AM  Timeout: 9/19/2019 10:57 AM  End time: 9/19/2019 11:03 AM    Staffing  Authorizing Provider: Ammon Celestin MD  Performing Provider: Mio Tillman MD    Preanesthetic Checklist  Completed: patient identified, site marked, surgical consent, pre-op evaluation, timeout performed, IV checked, risks and benefits discussed and monitors and equipment checked  Spinal Block  Patient position: sitting  Prep: ChloraPrep  Patient monitoring: heart rate, cardiac monitor, continuous pulse ox and frequent blood pressure checks  Approach: midline  Location: L3-4  Injection technique: single shot  CSF Fluid: clear free-flowing CSF  Needle  Needle type: pencil-tip   Needle gauge: 22 G  Needle length: 5 in  Additional Documentation: incremental injection, negative aspiration for heme and no paresthesia on injection  Needle localization: anatomical landmarks  Assessment  Sensory level: T8   Dermatomal levels determined by pinch or prick  Ease of block: moderate

## 2019-09-19 NOTE — ANESTHESIA PREPROCEDURE EVALUATION
2019  Tanisha Victoria is a 24 y.o., female full-term preg; hx C/S  X 3; now in labor w some fetal HR instability (¿early abruption?) => for repeat C/S.    PRIOR ANES (in Epic) 2018 C/S Spinal    bupivacaine 0.75% w dextrose 1.4ml    NAAC   C/S Spinal    bupivacaine 0.75% w dextrose 1.6ml    NAAC    ANES-RELATED MED/SURG  Patient Active Problem List   Diagnosis    Encounter for supervision of normal pregnancy in first trimester    History of  delivery, currently pregnant in second trimester    Previous  section complicating pregnancy    Decreased fetal movement    39 weeks gestation of pregnancy     Past Medical History:   Diagnosis Date    Anxiety      Past Surgical History:   Procedure Laterality Date     SECTION       SECTION N/A 2018    [facility]ick [facility] at [facility] L[facility]     SECTION N/A 10/30/2013    Per[facility] Wille[facility] Mu[facility]oz [facility] at [facility] L[facility]D    DELIVERY-CEASAREAN SECTION N/A 2015    [facility] at [facility] L[facility]D    TENDON RELEASE      right 3rd finger    TONSILLECTOMY       ALLERGIES  Review of patient's allergies indicates:  No Known Allergies    ANES-RELATED HOME Rx]      Pre-op Assessment         Review of Systems  Anesthesia Hx:  No previous Anesthesia  Neg history of prior surgery. Personal Hx of Anesthesia complications (pt thinks she had code blue with prev c/s but no sign of this in epic; vomited X 4 w c/s 2018)   Social:  Non-Smoker    Hematology/Oncology:  Hematology Normal        Cardiovascular:   Hypertension (controlled and not on any meds per pt)    Pulmonary:  Pulmonary Normal    Renal/:  Renal/ Normal     Hepatic/GI:   GERD    Psych:   Psychiatric History (Severe anxiety attacks that look like seizures, but not had any problems for a long time)        Wt Readings from Last 1  Encounters:   09/19/19 130.6 kg (288 lb)     Temp Readings from Last 1 Encounters:   09/19/19 36.9 °C (98.5 °F) (Oral)     BP Readings from Last 1 Encounters:   09/19/19 116/66     Pulse Readings from Last 1 Encounters:   09/19/19 64     SpO2 Readings from Last 1 Encounters:   09/19/19 99%       Physical Exam  General:  Morbid Obesity    Airway/Jaw/Neck:  Airway Findings: Mouth Opening: Small, but > 3cm Tongue: Normal  General Airway Assessment: Adult  Mallampati: III  TM Distance: 4-6 cm  Jaw/Neck Findings:  Neck ROM: Normal ROM  Neck Findings:  Girth Increased      Dental:  Dental Findings: In tact   Chest/Lungs:  Chest/Lungs Findings: Clear to auscultation, Normal Respiratory Rate     Heart/Vascular:  Heart Findings: Rate: Normal  Rhythm: Regular Rhythm  Sounds: Normal           Lab Results   Component Value Date    WBC 8.75 09/19/2019    HGB 12.7 09/19/2019    HCT 38.0 09/19/2019    MCV 86 09/19/2019     09/19/2019         CXR      EKG      ECHO      Anesthesia Plan  Type of Anesthesia, risks & benefits discussed:  Anesthesia Type:  spinal, CSE, general  Patient's Preference:   Intra-op Monitoring Plan:   Intra-op Monitoring Plan Comments:   Post Op Pain Control Plan:   Post Op Pain Control Plan Comments:   Induction:    Beta Blocker:         Informed Consent: Patient understands risks and agrees with Anesthesia plan.  Questions answered. Anesthesia consent signed with patient.  ASA Score: 3     Day of Surgery Review of History & Physical: I have interviewed and examined the patient. I have reviewed the patient's H&P dated:            Ready For Surgery From Anesthesia Perspective.

## 2019-09-19 NOTE — TRANSFER OF CARE
"Anesthesia Transfer of Care Note    Patient: Tanisha Victoria    Procedure(s) Performed: Procedure(s) (LRB):   SECTION, WITH TUBAL LIGATION (N/A)    Patient location: Labor and Delivery    Anesthesia Type: spinal    Transport from OR: Transported from OR on room air with adequate spontaneous ventilation    Post pain: adequate analgesia    Post assessment: no apparent anesthetic complications    Post vital signs: stable    Level of consciousness: awake, alert and oriented    Nausea/Vomiting: no nausea/vomiting    Complications: none          Last vitals:   Visit Vitals  BP 99/55   Pulse 64   Temp 36.9 °C (98.5 °F) (Oral)   Resp 16   Ht 5' 5" (1.651 m)   Wt 130.6 kg (288 lb)   LMP 2018   SpO2 (!) 94%   Breastfeeding? No   BMI 47.93 kg/m²     "

## 2019-09-19 NOTE — H&P
[facility]  Obstetrics  History & Physical    Patient Name: Tanisha Victoria  MRN: [id number]  Admission Date: 2019  Primary Care Provider: Primary Doctor No    Subjective:     Principal Problem:<principal problem not specified>    History of Present Illness: 25yo  presents to L&D for scheduled repeat c/s with BTL.  No complaints today.  No ctx/vb/lof.  +FM.  This pregnancy has been complicated by morbid obesity and h/o PTL, on [place] but noncompliant with weekly injections.    OB History    Para Term  AB Living   4 3 2 1 0 2   SAB TAB Ectopic Multiple Live Births   0 0 0 0 2      # Outcome Date GA Lbr Jorge A/2nd Weight Sex Delivery Anes PTL Lv   4 Current            3  18 35w5d  2.53 kg (5 lb 9.2 oz) F CS-LTranv EPI, Spinal Y       Complications: Fetal Intolerance      Name: VARGHESE, OSEAS BARON      Apgar1: 9  Apgar5: 9   2 Term 04/06/15 39w3d  3.345 kg (7 lb 6 oz) F CS-LTranv   BIN   1 Term 10/30/13 38w5d  3.209 kg (7 lb 1.2 oz) F CS-LTranv Spinal N BIN      Name: VARGHESE,BABY GIRL      Apgar1: 9  Apgar5: 9     Past Medical History:   Diagnosis Date    Anxiety      Past Surgical History:   Procedure Laterality Date     SECTION       SECTION N/A 2018    Performed by [facility]ick [facility] at [facility] L&D     SECTION N/A 10/30/2013    Performed by Willem Mu[facility]oz MD at [facility] L&D    DELIVERY-CEASAREAN SECTION N/A 2015    Performed by [facility]m [facility]oz [facility] at [facility] L&D    TENDON RELEASE      right 3rd finger    TONSILLECTOMY         Facility-Administered Medications Prior to Admission   Medication    HYDROXYprogesterone cap(ppres) (NEEL) injection 250 mg     PTA Medications   Medication Sig    NEEL, PF, 275 mg/1.1 mL AtIn INJ 1.1 ML SC VIA AUTOINJECTOR ONCE A WEEK       Review of patient's allergies indicates:  No Known Allergies     Family History     Problem Relation (Age of Onset)    Diabetes Maternal Grandfather     Hypertension Maternal Grandmother        Tobacco Use    Smoking status: Never Smoker    Smokeless tobacco: Never Used   Substance and Sexual Activity    Alcohol use: No    Drug use: No    Sexual activity: Yes     Partners: Male     Review of Systems   Objective:     Vital Signs (Most Recent):  Temp: 98.5 °F (36.9 °C) (19 0729)  Pulse: 65 (19 1036)  Resp: 16 (19 1036)  BP: 121/84 (19 1036)  SpO2: 96 % (19 1035) Vital Signs (24h Range):  Temp:  [98.5 °F (36.9 °C)] 98.5 °F (36.9 °C)  Pulse:  [61-77] 65  Resp:  [16] 16  SpO2:  [93 %-100 %] 96 %  BP: (116-126)/(54-84) 121/84     Weight: 130.6 kg (288 lb)  Body mass index is 47.93 kg/m².    Physical Exam  Gen: NAD  CV: RRR  Lungs: CTAB  Abd: soft, NT, obese. gravid  Pelvic: deferred  Ext: normal ROM  Psych: appropriate affect  Neuro: grossly intact      Significant Labs:  Lab Results   Component Value Date    GROUPTRH AB POS 2019    HEPBSAG Negative 2019    STREPBCULT No Group B Streptococcus isolated 2019     Lab Results   Component Value Date    WBC 8.75 2019    HGB 12.7 2019    HCT 38.0 2019    MCV 86 2019     2019         Assessment/Plan:     24 y.o. female  at 39w0d with prev c/s x 3, and undesired fertility  - will proceed with repeat c/s and BTL.  All questions answered.     Nori Mcmahon MD  Obstetrics  [facility]

## 2019-09-20 PROBLEM — Z3A.39 39 WEEKS GESTATION OF PREGNANCY: Status: RESOLVED | Noted: 2019-09-19 | Resolved: 2019-09-20

## 2019-09-20 LAB
BASOPHILS # BLD AUTO: 0.01 K/UL (ref 0–0.2)
BASOPHILS NFR BLD: 0.1 % (ref 0–1.9)
DIFFERENTIAL METHOD: ABNORMAL
EOSINOPHIL # BLD AUTO: 0.1 K/UL (ref 0–0.5)
EOSINOPHIL NFR BLD: 1.1 % (ref 0–8)
ERYTHROCYTE [DISTWIDTH] IN BLOOD BY AUTOMATED COUNT: 13.1 % (ref 11.5–14.5)
HCT VFR BLD AUTO: 31.5 % (ref 37–48.5)
HGB BLD-MCNC: 10.2 G/DL (ref 12–16)
LYMPHOCYTES # BLD AUTO: 2 K/UL (ref 1–4.8)
LYMPHOCYTES NFR BLD: 20.8 % (ref 18–48)
MCH RBC QN AUTO: 28.7 PG (ref 27–31)
MCHC RBC AUTO-ENTMCNC: 32.4 G/DL (ref 32–36)
MCV RBC AUTO: 89 FL (ref 82–98)
MONOCYTES # BLD AUTO: 0.9 K/UL (ref 0.3–1)
MONOCYTES NFR BLD: 9.1 % (ref 4–15)
NEUTROPHILS # BLD AUTO: 6.6 K/UL (ref 1.8–7.7)
NEUTROPHILS NFR BLD: 68.9 % (ref 38–73)
PLATELET # BLD AUTO: 214 K/UL (ref 150–350)
PMV BLD AUTO: 10.7 FL (ref 9.2–12.9)
RBC # BLD AUTO: 3.56 M/UL (ref 4–5.4)
RPR SER QL: NORMAL
WBC # BLD AUTO: 9.58 K/UL (ref 3.9–12.7)

## 2019-09-20 PROCEDURE — 99231 SBSQ HOSP IP/OBS SF/LOW 25: CPT | Mod: ,,, | Performed by: OBSTETRICS & GYNECOLOGY

## 2019-09-20 PROCEDURE — 11000001 HC ACUTE MED/SURG PRIVATE ROOM

## 2019-09-20 PROCEDURE — 85025 COMPLETE CBC W/AUTO DIFF WBC: CPT

## 2019-09-20 PROCEDURE — 99231 PR SUBSEQUENT HOSPITAL CARE,LEVL I: ICD-10-PCS | Mod: ,,, | Performed by: OBSTETRICS & GYNECOLOGY

## 2019-09-20 PROCEDURE — 25000003 PHARM REV CODE 250: Performed by: OBSTETRICS & GYNECOLOGY

## 2019-09-20 PROCEDURE — 63600175 PHARM REV CODE 636 W HCPCS: Performed by: OBSTETRICS & GYNECOLOGY

## 2019-09-20 PROCEDURE — 36415 COLL VENOUS BLD VENIPUNCTURE: CPT

## 2019-09-20 RX ORDER — OXYCODONE AND ACETAMINOPHEN 5; 325 MG/1; MG/1
1 TABLET ORAL EVERY 6 HOURS PRN
Qty: 28 TABLET | Refills: 0 | Status: SHIPPED | OUTPATIENT
Start: 2019-09-20 | End: 2019-09-27 | Stop reason: SDUPTHER

## 2019-09-20 RX ORDER — IBUPROFEN 800 MG/1
800 TABLET ORAL EVERY 8 HOURS PRN
Qty: 60 TABLET | Refills: 3 | Status: SHIPPED | OUTPATIENT
Start: 2019-09-20

## 2019-09-20 RX ADMIN — DOCUSATE SODIUM 200 MG: 100 CAPSULE, LIQUID FILLED ORAL at 08:09

## 2019-09-20 RX ADMIN — KETOROLAC TROMETHAMINE 30 MG: 30 INJECTION, SOLUTION INTRAMUSCULAR at 12:09

## 2019-09-20 RX ADMIN — KETOROLAC TROMETHAMINE 30 MG: 30 INJECTION, SOLUTION INTRAMUSCULAR at 04:09

## 2019-09-20 RX ADMIN — SIMETHICONE CHEW TAB 80 MG 160 MG: 80 TABLET ORAL at 05:09

## 2019-09-20 RX ADMIN — OXYCODONE HYDROCHLORIDE AND ACETAMINOPHEN 1 TABLET: 10; 325 TABLET ORAL at 04:09

## 2019-09-20 RX ADMIN — MUPIROCIN 1 G: 20 OINTMENT TOPICAL at 09:09

## 2019-09-20 RX ADMIN — SIMETHICONE CHEW TAB 80 MG 160 MG: 80 TABLET ORAL at 09:09

## 2019-09-20 RX ADMIN — SODIUM CHLORIDE, SODIUM LACTATE, POTASSIUM CHLORIDE, AND CALCIUM CHLORIDE: .6; .31; .03; .02 INJECTION, SOLUTION INTRAVENOUS at 05:09

## 2019-09-20 RX ADMIN — OXYCODONE HYDROCHLORIDE AND ACETAMINOPHEN 1 TABLET: 10; 325 TABLET ORAL at 03:09

## 2019-09-20 RX ADMIN — MUPIROCIN 1 G: 20 OINTMENT TOPICAL at 08:09

## 2019-09-20 RX ADMIN — OXYCODONE HYDROCHLORIDE AND ACETAMINOPHEN 1 TABLET: 10; 325 TABLET ORAL at 11:09

## 2019-09-20 RX ADMIN — OXYCODONE HYDROCHLORIDE AND ACETAMINOPHEN 1 TABLET: 10; 325 TABLET ORAL at 07:09

## 2019-09-20 RX ADMIN — OXYCODONE HYDROCHLORIDE AND ACETAMINOPHEN 1 TABLET: 10; 325 TABLET ORAL at 09:09

## 2019-09-20 RX ADMIN — DOCUSATE SODIUM 200 MG: 100 CAPSULE, LIQUID FILLED ORAL at 09:09

## 2019-09-20 RX ADMIN — IBUPROFEN 800 MG: 400 TABLET, FILM COATED ORAL at 05:09

## 2019-09-20 NOTE — ANESTHESIA POSTPROCEDURE EVALUATION
Anesthesia Post Evaluation    Patient: Tanisha Victoria    Procedure(s) Performed: Procedure(s) (LRB):   SECTION, WITH TUBAL LIGATION (N/A)        Anesthetic complications: no                Vitals Value Taken Time   /87 2019  8:00 AM   Temp 36.7 °C (98.1 °F) 2019  8:00 AM   Pulse 83 2019  8:00 AM   Resp 18 2019  8:00 AM   SpO2 98 % 2019  8:00 AM         No case tracking events are documented in the log.      Pain/Radha Score: Pain Rating Prior to Med Admin: 8 (2019  9:45 AM)  Pain Rating Post Med Admin: 2 (2019  5:00 AM)      Post Anesthesia Evaluation  Anesthesia Type: CSE  Patient Location: OB floor  Post Pain: Adequate analgesia  Post Assessment: no apparent anesthetic complications and tolerated procedure well  Post Vital Signs: stable  Was patient able to participate in evaluation? Yes  Level of Consciousness: awake, alert  and oriented  Nausea/Vomiting: no nausea/no vomiting  Complications: none  Airway Patency: patent  Respiratory: unassisted  Cardiovascular: stable  Hydration: euvolemic  Follow-up Needed: No    No catheter in back  No headache/neckache/backache  Full return of neurological function  Able to urinate  Advised patient to report any new problems of back pain, especially with fever or decreasing bladder function occurring during coming days to weeks    Awake, alert & oriented  yes  Vital signs stable  yes  Pain controlled  yes  No nausea/vomiting  yes  Adequate hydration  yes

## 2019-09-20 NOTE — PROGRESS NOTES
[facility]  Obstetrics  Postpartum Progress Note    Patient Name: Tanisha Victoria  MRN: [id number]  Admission Date: 2019  Hospital Length of Stay: 1 days  Attending Physician: Nori Mcmahon MD  Primary Care Provider: Primary Doctor No    Subjective:     Principal Problem:S/P  section    Subjective  POD 1, s/p RCD/BTL.  Patient doing well.  Lochia minimal.  Pain controlled with medication.  Has not ambulated yet, sung just removed.  + Flatus. Tolerating regular diet.  Breast and bottle feeding.    Objective  Vitals:    19 0800   BP: 134/87   Pulse: 83   Resp: 18   Temp: 98.1 °F (36.7 °C)       Gen: NAD  CV: RRR  Lungs: CTAB  Abd: Soft, NT, ND.  Fundus firm, NT, below umbilicus  Incision: Aquacel dressing clean and intact  Ext: No calf tenderness  Psych: appropriate affect  Neuro: Grossly intact    Recent Labs   Lab 19  0443   WBC 9.58   RBC 3.56*   HGB 10.2*   HCT 31.5*      MCV 89   MCH 28.7   MCHC 32.4       Assessment/Plan:     24 y.o. female  s/p RCD/BTL, POD 1, in stable condition.  - Continue routine post-op/postpartum care.  - Encourage ambulation.  - Plan d/c home tomorrow or POD 3, once meeting all milestones.    Nori Mcmahon MD  Obstetrics  [facility]

## 2019-09-21 VITALS
HEART RATE: 88 BPM | HEIGHT: 65 IN | SYSTOLIC BLOOD PRESSURE: 119 MMHG | DIASTOLIC BLOOD PRESSURE: 66 MMHG | BODY MASS INDEX: 47.98 KG/M2 | RESPIRATION RATE: 18 BRPM | TEMPERATURE: 99 F | WEIGHT: 288 LBS | OXYGEN SATURATION: 98 %

## 2019-09-21 PROCEDURE — 25000003 PHARM REV CODE 250: Performed by: OBSTETRICS & GYNECOLOGY

## 2019-09-21 PROCEDURE — 99238 HOSP IP/OBS DSCHRG MGMT 30/<: CPT | Mod: ,,, | Performed by: OBSTETRICS & GYNECOLOGY

## 2019-09-21 PROCEDURE — 99238 PR HOSPITAL DISCHARGE DAY,<30 MIN: ICD-10-PCS | Mod: ,,, | Performed by: OBSTETRICS & GYNECOLOGY

## 2019-09-21 RX ADMIN — SIMETHICONE CHEW TAB 80 MG 160 MG: 80 TABLET ORAL at 09:09

## 2019-09-21 RX ADMIN — OXYCODONE HYDROCHLORIDE AND ACETAMINOPHEN 1 TABLET: 10; 325 TABLET ORAL at 09:09

## 2019-09-21 RX ADMIN — OXYCODONE HYDROCHLORIDE AND ACETAMINOPHEN 1 TABLET: 10; 325 TABLET ORAL at 03:09

## 2019-09-21 RX ADMIN — MUPIROCIN 1 G: 20 OINTMENT TOPICAL at 09:09

## 2019-09-21 RX ADMIN — IBUPROFEN 800 MG: 400 TABLET, FILM COATED ORAL at 05:09

## 2019-09-21 RX ADMIN — DOCUSATE SODIUM 200 MG: 100 CAPSULE, LIQUID FILLED ORAL at 09:09

## 2019-09-21 NOTE — DISCHARGE INSTRUCTIONS
"Patient Discharge Instructions for Postpartum Women    Resume Regular Diet  Increase activity gradually, no heavy lifting  Shower  No tampons, douching or sexual intercourse.  Discuss birth control options with your physician.  Wear a support bra  Return to work/school when you've been cleared by a physician    Call your physician if     *Fever of 100.4 or higher  *Persistent nausea/ vomiting  *Incisional drainage  *Heavy vaginal bleeding or large clots (Heavy bleeding is soaking 1 pad in an hour)  *Swelling and pain in arms or legs  *Severe headaches, blurred vision or fainting  *Shortness of breath  *Frequency and burning with urination  *Signs of postpartum depression, discuss these signs with your physician    Call lactation services for questions regarding feeding, nipple and breast care, and general questions about lactation.  They can be reached at [phone]         Understanding Postpartum Depression    You've just had a baby.  You know you should be excited and happy.  But instead you find yourself crying for no reason.  You may have trouble coping with your daily tasks.  You feel sad, tired, and hopeless most of the time.  You may even feel ashamed or guilty.  But what you're going through is not your fault and you can feel better.  Talk to your doctor.  He or she can help.    Depression After Childbirth    You may be weepy and tired right after giving birth.  These feelings are normal.  They're sometimes called the "baby blues."  These blues go away 2-3 weeks.  However, postpartum (meaning "after birth") depression lasts much longer and is more sever than the "baby blues."  It can make you feel sad and hopeless.  You may also fear that your baby will be harmed and worry about being a bad mother.      What is Depression?    Depression is a mood disorder that affects the way you think and feel.  The most common symptom is a feeling of deep sadness.  You may also feel as if you just can't cope with life.  "   Other symptoms include:      * Gaining or loosing weight  * Sleeping too much or too little  * Feeling tired all the time  * Feeling restless  * Fears of harming your baby   * Lack of interest in your baby  * Feeling worthless or guilty  * No longer finding pleasure in things you used to  * Having trouble thinking clearly or making decisions  * Thoughts of hurting yourself or your baby    What Causes Postpartum Depression    The exact causes of postpartum depression isn't known.  It may be due to changes in your hormones during and after childbirth.  You may also be tired from caring for your baby and adjusting to being a mother.  All these factors may make you feel depressed.  In some cases, your genes may also play a role.    Depression Can Be Treated    The good news is that there are many ways to treat postpartum depression.  Talking to your doctor is the first step toward feeling better.    Resources:    * National [place] of Mental Health  -- 392.386.4405    www.nimh.nih.gov    * National [place] on Mental Illness --809.230.7245    Www.dani.org    * Mental Health [place] -- 312.109.5825     Www.[facility].org    * National Suicide Hotline --147.806.5734 (800-SUICIDE)    0862-2732 [employer]  All rights reserved.  This information is not intended as a substitute for professional medical care.  Always follow up with your healthcare professional's instructions.

## 2019-09-21 NOTE — PROGRESS NOTES
POD #2    S: patient doing well. Pain improved with pain meds.  (reports that she feels more sore with this  than previous C-sections).  Voiding spontaneously. Tolerating PO . Passing flatus. Min vaginal bleeding.    O  Temp:  [97.6 °F (36.4 °C)-98.6 °F (37 °C)]   Pulse:  []   Resp:  [15-18]   BP: ()/(48-87)   SpO2:  [91 %-100 %]    Gen: A&Ox3, NAD  Abd: morbid obesity, appropriate ttp of abdomen, incision with aquacel bandage c/d/i  Ext: no edema    Lab Results   Component Value Date    WBC 9.58 2019    HGB 10.2 (L) 2019    HCT 31.5 (L) 2019    MCV 89 2019     2019     AB POS    AP: 23 yo  female s/p rLTCS/BTL, doing well  Continue routine pp care  Declines circumcision of male infant   Discharge home today    trevor ramon MD

## 2019-09-21 NOTE — DISCHARGE SUMMARY
[facility]  Obstetrics  Discharge Summary      Patient Name: Tanisha Victoria  MRN: [id number]  Admission Date: 2019  Hospital Length of Stay: 2 days  Discharge Date and Time:  2019 7:52 AM  Attending Physician: Nori Mcmahon MD   Discharging Provider: Lv Jensen MD  Primary Care Provider: Primary Doctor No    HPI: 25 yo now  female admitted at  39+ weeks gestation for rLTCS and BTL.    Procedure(s) (LRB):   SECTION, WITH TUBAL LIGATION (N/A)     Hospital Course: The patient's surgery was uncomplicated. Please see operative note for details. Her postpartum course was also uncomplicated. On day of discharge, she was tolerating PO. Her pain was well controlled. She was ambulating and voiding spontaneously.       Final Active Diagnoses:    Diagnosis Date Noted POA    PRINCIPAL PROBLEM:  S/P  section [Z98.891] 2018 Not Applicable    Encounter for sterilization [Z30.2] 2019 Not Applicable      Problems Resolved During this Admission:    Diagnosis Date Noted Date Resolved POA    39 weeks gestation of pregnancy [Z3A.39] 2019 Not Applicable    39 weeks gestation of pregnancy [Z3A.39] 2019 Not Applicable      Lab Results   Component Value Date    WBC 9.58 2019    HGB 10.2 (L) 2019    HCT 31.5 (L) 2019    MCV 89 2019     2019     AB POS    Feeding Method: bottle    Immunizations     Date Immunization Status Dose Route/Site Given by    19 MMR Incomplete 0.5 mL Subcutaneous/Left deltoid     19 Tdap Incomplete 0.5 mL Intramuscular/Left deltoid           Delivery:    Episiotomy: None   Lacerations: None   Repair suture:     Repair # of packets:     Blood loss (ml): 0     Birth information:  YOB: 2018   Time of birth: 11:27 AM   Sex: male   Delivery type: , Low Transverse   Gestational Age: 39w0d    Delivery Clinician:      Other  providers:       Additional  information:  Forceps:    Vacuum:    Breech:    Observed anomalies      Living?:           APGARS  One minute Five minutes Ten minutes   Skin color:         Heart rate:         Grimace:         Muscle tone:         Breathing:         Totals: 9  9        Placenta: Delivered:       appearance    Pending Diagnostic Studies:     Procedure Component Value Units Date/Time    Specimen to Pathology - Surgery [[id number]] Collected:  09/19/19 1348    Order Status:  Sent Lab Status:  In process Updated:  09/20/19 1117    Specimen:  Tissue           Discharged Condition: good    Disposition: Home or Self Care    Follow Up:  Follow-up Information     Nori Mcmahon MD. Schedule an appointment as soon as possible for a visit on 9/26/2019.    Specialty:  Obstetrics and Gynecology  Why:  Bandage removal, Postpartum follow-up  Contact information:  [address] [place] [address] [phone]                 Patient Instructions:      Activity as tolerated     Medications:  Current Discharge Medication List      START taking these medications    Details   ibuprofen (ADVIL,MOTRIN) 800 MG tablet Take 1 tablet (800 mg total) by mouth every 8 (eight) hours as needed for Pain.  Qty: 60 tablet, Refills: 3      oxyCODONE-acetaminophen (PERCOCET) 5-325 mg per tablet Take 1 tablet by mouth every 6 (six) hours as needed (Severe pain).  Qty: 28 tablet, Refills: 0    Comments: Quantity prescribed more than 7 day supply? No         STOP taking these medications       KARIN SHAIKH, 275 mg/1.1 mL AtIn Comments:   Reason for Stopping:               Lv Jensen MD  Obstetrics  [facility]

## 2019-09-26 ENCOUNTER — TELEPHONE (OUTPATIENT)
Dept: OBSTETRICS AND GYNECOLOGY | Facility: CLINIC | Age: 24
End: 2019-09-26

## 2019-09-26 NOTE — TELEPHONE ENCOUNTER
She would like a refill on pain medication.  She has 5 Percocet and  she still has the Ibuprofen.  Patient is coming in tomorrow for an appointment.

## 2019-09-26 NOTE — TELEPHONE ENCOUNTER
----- Message from Mary Ma sent at 9/26/2019 12:38 PM CDT -----  Contact: pt  Pt would like to be called back regarding on her pain meds  Pt can be reached at [phone]

## 2019-09-27 ENCOUNTER — POSTPARTUM VISIT (OUTPATIENT)
Dept: OBSTETRICS AND GYNECOLOGY | Facility: CLINIC | Age: 24
End: 2019-09-27
Payer: MEDICAID

## 2019-09-27 VITALS — DIASTOLIC BLOOD PRESSURE: 78 MMHG | SYSTOLIC BLOOD PRESSURE: 114 MMHG | BODY MASS INDEX: 46.34 KG/M2 | WEIGHT: 278.5 LBS

## 2019-09-27 DIAGNOSIS — Z98.891 S/P CESAREAN SECTION: ICD-10-CM

## 2019-09-27 PROCEDURE — 99999 PR PBB SHADOW E&M-EST. PATIENT-LVL II: CPT | Mod: PBBFAC,,, | Performed by: OBSTETRICS & GYNECOLOGY

## 2019-09-27 PROCEDURE — 99999 PR PBB SHADOW E&M-EST. PATIENT-LVL II: ICD-10-PCS | Mod: PBBFAC,,, | Performed by: OBSTETRICS & GYNECOLOGY

## 2019-09-27 PROCEDURE — 59430 PR CARE AFTER DELIVERY ONLY: ICD-10-PCS | Mod: ,,, | Performed by: OBSTETRICS & GYNECOLOGY

## 2019-09-27 PROCEDURE — 99212 OFFICE O/P EST SF 10 MIN: CPT | Mod: PBBFAC,PO | Performed by: OBSTETRICS & GYNECOLOGY

## 2019-09-27 RX ORDER — OXYCODONE AND ACETAMINOPHEN 5; 325 MG/1; MG/1
1 TABLET ORAL EVERY 6 HOURS PRN
Qty: 10 TABLET | Refills: 0 | Status: SHIPPED | OUTPATIENT
Start: 2019-09-27

## 2019-09-30 NOTE — PROGRESS NOTES
Subjective:       Patient ID: Tanisha Victoria is a 24 y.o. female.    Chief Complaint:  Postpartum Care      History of Present Illness  25yo  s/p RCD/BTL on 19 presents for bandage removal.  C/o pain around incision, has used all of her percocet pills.  Has very little help at home and having to take care of the children by herself.  No s/sx of depression.  Normal BM.  Lochia minimal.  No other complaints today.    GYN & OB History  Patient's last menstrual period was 2018.   Date of Last Pap: 3/21/2019    OB History    Para Term  AB Living   4 3 2 1 0 2   SAB TAB Ectopic Multiple Live Births   0 0 0 0 2      # Outcome Date GA Lbr Jorge A/2nd Weight Sex Delivery Anes PTL Lv   4             3  18 35w5d  2.53 kg (5 lb 9.2 oz) F CS-LTranv EPI, Spinal Y       Complications: Fetal Intolerance   2 Term 04/06/15 39w3d  3.345 kg (7 lb 6 oz) F CS-LTranv   BIN   1 Term 10/30/13 38w5d  3.209 kg (7 lb 1.2 oz) F CS-LTranv Spinal N BIN       Review of Systems  Review of Systems: Neg x 10, except as per HPI        Objective:    Physical Exam     /78   Wt 126.3 kg (278 lb 8 oz)   LMP 2018   Breastfeeding? No   BMI 46.34 kg/m²     Gen: NAD  Resp: Normal respiratory effort  Abd: soft, NT  Pfan incision: Aquacel dressing removed, incision healing well.  No erythema, breakdown, or exudate, but large panus hanging over.    Pelvic: deferred  Ext: normal ROM  Psych: appropriate affect  Neuro: grossly intact    Assessment:        1. Postpartum state    2. S/P  section              Plan:      1.  Doing well from postpartum stand point.  Will rx 10 more percocet tablets, but discussed this will be the last rx.  Encourage motrin.   2.  Discussed good hygiene techniques - pt at risk for infection and/or breakdown.  Expressed understanding.  3.  Counseling done, precautions given, all questions answered.  RTC 1 wk for incision check.

## 2022-03-10 NOTE — L&D DELIVERY NOTE
[facility]   Section   Operative Note    SUMMARY     Date of Procedure: 2019     Procedure: Procedure(s) (LRB):   SECTION, WITH TUBAL LIGATION (N/A)    Surgeon(s) and Role:     * Nori Mcmahon MD - Primary    Assistant: Jeana Alex    Pre-Operative Diagnosis: S/P repeat low transverse  [Z98.891] with undesired fertility    Post-Operative Diagnosis: Post-Op Diagnosis Codes:     * S/P repeat low transverse  [Z98.891]    Anesthesia: Spinal/Epidural           Description of the Findings of the Procedure:   1.  Normal uterus, tubes, and ovaries  2.  Viable male infant    Complications: No    Blood Loss: 500cc     Procedure Detail:  The patient was taken to the Operating Room, where adequate anesthesia was obtained.  She was prepared and draped in the normal sterile fashion. Pfannenstiel skin incision was made with the scalpel and taken down to the level of the fascia.  Fascia was incised in the midline and then extended laterally.  Superior aspect of the incision was grasped with the Kocher clamps, elevated, and the rectus muscles were dissected off the fascia.  Rectus muscles were  in the midline, and the peritoneum was entered bluntly.  The bladder blade was inserted and the bladder flap was created with Metzenbaum scissors.  The bladder blade was reinserted, and a low-transverse incision was made on the uterus with the scalpel.  Clear fluid noted.  The fetal head was elevated and delivered through the abdominal incision, followed by the remainder of the infant without difficulty.  The cord was clamped x 2, cut in between, and the infant was handed to awaiting nursery nurses.  The placenta then delivered manually intact, and a 3-vessel cord was noted.  The uterus was cleared of all clots and debris, and delivered through the abdominal incision for repair.  The uterus was repaired with 0-chromic in a running, locking fashion, good hemostasis  was noted.      Attention was then turned to the tubal ligation.  The right fallopian tube was elevated with a sushila and followed to the fimbriated end.  A small hole was made in the mesoslpinx near the mid-isthmic region.  2-0 plain gut was used to tie a pedicle on either side of the sushila, and the approximately 3cm segment was excised and sent to pathology.  The pedicles were then cauterized to obtain hemostasis.  The same procedure was performed on the left.  Good hemostasis was noted.      The uterus was then returned the the abdomen and the abdominal cavity was thoroughly irrigated and suctioned cleared of all clots and debris.  Good hemostasis was again noted.  The rectus muscles were reapproximated with 2-0 chromic, and and the fascia was closed the 0-vicryl.  The subcutaneous tissue was irrigated and reapproximated with 2-0 plain gut, and the skin was closed with 4-0 monocryl in a subcuticular fashion.  Dermabond followed by aquacel dressing was then placed.    Sponge, lap, and needle counts were correct x2. The patient tolerated the procedure well and was taken to recovery in stable condition.    Nori Mcmahon MD           Specimens:   Specimen (12h ago, onward)    None          Condition: Stable    Disposition: PACU - hemodynamically stable.           Delivery Information for  Ramiro Victoria    Birth information:  YOB: 2018   Time of birth: 11:27 AM   Sex: male   Head Delivery Date/Time: 2019 11:27 AM   Delivery type: , Low Transverse   Gestational Age: 39w0d    Delivery Providers    Delivering clinician:  Nori Mcmahon MD   Provider Role    MARIELENA Burdick RN Lisa Scozzari, ST Vicki M. Stricker, LPN Felicitas Okhakia, RN John Helmstetter, MD Adam Dean Weinberg, MD             Measurements    Weight:    Length:           Apgars    Living status:  Living  Apgars:   1 min.:   5 min.:   10 min.:   15 min.:   20  min.:     Skin color:   1  1       Heart rate:   2  2       Reflex irritability:   2  2       Muscle tone:   2  2       Respiratory effort:   2  2       Total:   9  9       Apgars assigned by:  BERNABE LIVINGSTON RN         Operative Delivery    Forceps attempted?:  No  Vacuum extractor attempted?:  No         Shoulder Dystocia    Shoulder dystocia present?:  No           Presentation    Presentation:  Vertex  Position:  Middle Occiput Anterior           Interventions/Resuscitation    Method:  Bulb Suctioning, Tactile Stimulation       Cord    Vessels:  3 vessels  Complications:  None  Delayed Cord Clamping?:  No  Cord Clamped Date/Time:  2019 11:28 AM  Cord Blood Disposition:  Sent with Baby  Gases Sent?:  No  Stem Cell Collection (by MD):  No       Placenta    Placenta delivery date/time:  2019 1128  Placenta removal:  Manual removal  Placenta appearance:  Intact  Placenta disposition:  discarded           Labor Events:       labor: No     Labor Onset Date/Time:         Dilation Complete Date/Time:         Start Pushing Date/Time:       Rupture Date/Time:              Rupture type: artificial rupture of membranes         Fluid Amount: moderate      Fluid Color:        Fluid Odor:        Membrane Status (PeriCalm):        Rupture Date/Time (PeriCalm):        Fluid Amount (PeriCalm):        Fluid Color (PeriCalm):         steroids: None     Antibiotics given for GBS: No     Induction: none     Indications for induction:        Augmentation:       Indications for augmentation:       Labor complications: None     Additional complications:          Cervical ripening:                     Delivery:      Episiotomy: None     Indication for Episiotomy:       Perineal Lacerations: None Repaired:      Periurethral Laceration:   Repaired:     Labial Laceration:   Repaired:     Sulcus Laceration:   Repaired:     Vaginal Laceration:   Repaired:     Cervical Laceration:   Repaired:     Repair suture:       Repair  # of packets:       Last Value - EBL - Nursing (mL): 0     Sum - EBL - Nursing (mL): 0     Last Value - EBL - Anesthesia (mL):      Calculated QBL (mL): 243      Vaginal Sweep Performed: Yes     Surgicount Correct: Yes       Other providers:       Anesthesia    Method:  Spinal          Details (if applicable):  Trial of Labor No    Categorization: Repeat    Priority: Routine   Indications for :     Incision Type: low transverse     Additional  information:  Forceps:    Vacuum:    Breech:    Observed anomalies    Other (Comments):            URGENT neuro follow up for vertiginous symptoms

## 2022-11-21 NOTE — PLAN OF CARE
0930 Dr Mcmahon in surgery and will notify RN when it is time to proceed with  and will hang antibiotics. Dr Tillman updated, will slow IV fluids until to 125 ml until we proceed to . Pt updated on plan of care.   
1050 Dr Mcmahon calls okay to proceed to OR2, Pt up to restroom, belly cleaned and SARAH wipes used, Delivery of viable baby boy 9/9 apgars at 1127, see OR record and delivery summary.   
1215 Pt moved to room 358, recovery plan reviewed, pt feeling nausea Bp 99/55, next /63, will give Zofran and see if nausea is relieved. .   
1223 Baby latched to right breast, good suck and swallow noted.   
Problem: Adult Inpatient Plan of Care  Goal: Plan of Care Review  Outcome: Ongoing (interventions implemented as appropriate)  0700  arrives for scheduled repeat  with BTL, consents in chart and signed. Pt took shower with hepa-cleanse, Reviewed allergy and medical history, procedure teaching and recovery teaching done. Mom at bedside and spouse will come after delivery. Pt plans to formula feed. Pt clipped and cleaned prior to EFM and TOCO, SCd's placed. Pt can recall all information      
Problem: Adult Inpatient Plan of Care  Goal: Plan of Care Review  Outcome: Ongoing (interventions implemented as appropriate)  Pain controlled.  Toelrating reg diet.  Encouraged patient to drink fluids.  Urine draining dark kayley urine.  IV fluids infusing.  Breastfeeding support given.  Safety maintained.  Bonding well with baby.  Will cont to monitor.      
Problem: Breastfeeding  Goal: Effective Breastfeeding  Pt plans to formula feed, educated on benefits of breastfeeding, was unsuccessful with 3 previous children. Pt will do skin to skin.       
none

## 2024-01-20 ENCOUNTER — HOSPITAL ENCOUNTER (EMERGENCY)
Facility: HOSPITAL | Age: 29
Discharge: HOME | End: 2024-01-20
Attending: EMERGENCY MEDICINE
Payer: MEDICAID

## 2024-01-20 VITALS
OXYGEN SATURATION: 99 % | RESPIRATION RATE: 18 BRPM | HEIGHT: 65 IN | TEMPERATURE: 98 F | WEIGHT: 250 LBS | HEART RATE: 93 BPM | SYSTOLIC BLOOD PRESSURE: 133 MMHG | DIASTOLIC BLOOD PRESSURE: 85 MMHG | BODY MASS INDEX: 41.65 KG/M2

## 2024-01-20 DIAGNOSIS — M79.673 FOOT PAIN: ICD-10-CM

## 2024-01-20 DIAGNOSIS — M79.672 LEFT FOOT PAIN: Primary | ICD-10-CM

## 2024-01-20 DIAGNOSIS — M25.579 ANKLE PAIN: ICD-10-CM

## 2024-01-20 LAB
B-HCG UR QL: NEGATIVE
CTP QC/QA: YES

## 2024-01-20 PROCEDURE — 25000003 PHARM REV CODE 250

## 2024-01-20 PROCEDURE — 81025 URINE PREGNANCY TEST: CPT

## 2024-01-20 PROCEDURE — 99284 EMERGENCY DEPT VISIT MOD MDM: CPT

## 2024-01-20 RX ORDER — HYDROCODONE BITARTRATE AND ACETAMINOPHEN 5; 325 MG/1; MG/1
1 TABLET ORAL EVERY 6 HOURS PRN
Qty: 12 TABLET | Refills: 0 | Status: SHIPPED | OUTPATIENT
Start: 2024-01-20

## 2024-01-20 RX ORDER — IBUPROFEN 600 MG/1
600 TABLET ORAL
Status: COMPLETED | OUTPATIENT
Start: 2024-01-20 | End: 2024-01-20

## 2024-01-20 RX ORDER — IBUPROFEN 600 MG/1
600 TABLET ORAL EVERY 6 HOURS PRN
Qty: 30 TABLET | Refills: 0 | Status: SHIPPED | OUTPATIENT
Start: 2024-01-20

## 2024-01-20 RX ADMIN — IBUPROFEN 600 MG: 600 TABLET ORAL at 06:01

## 2024-01-20 NOTE — Clinical Note
"Tanisha "Zeynep Victoria was seen and treated in our emergency department on 1/20/2024.  She may return to work on 01/22/2024.       If you have any questions or concerns, please don't hesitate to call.      Bert Galdamez PA-C"

## 2024-01-21 NOTE — DISCHARGE INSTRUCTIONS

## 2024-01-21 NOTE — ED PROVIDER NOTES
Encounter Date: 2024       History     Chief Complaint   Patient presents with    Foot Pain     Pt reports pain to bilateral feet x1 week. Pt states she stands up at work all day long and has had no days off lately. Pt ambulatory in triage. Pt denies taking any medications for pain today.     29-year-old female with past medical history of anxiety presents to ED for emergent evaluation of bilateral feet pain (L>R) that started 7 days ago.  She reports a cracking sensation to her left ankle with ambulation.  Patient reports working as a  and is constantly on her feet.  She denies any new trauma, fall, head trauma, loss of consciousness.  She reports injury to her left foot/ankle when she was a child where she had torn ligaments.  She denies any attempted treatment.  Her last menstrual period was 01/10/2024.  She denies any fever, chills, chest pain, shortness of breath, abdominal pain, nausea, vomiting, diarrhea, dysuria, hematuria.  No other symptoms reported.    The history is provided by the patient. No  was used.     Review of patient's allergies indicates:  No Known Allergies  Past Medical History:   Diagnosis Date    Anxiety      Past Surgical History:   Procedure Laterality Date     SECTION       SECTION N/A 2018    Procedure:  SECTION;  Surgeon: Khushi Vick MD;  Location: [facility] L&D;  Service: OB/GYN;  Laterality: N/A;     SECTION WITH TUBAL LIGATION N/A 2019    Procedure:  SECTION, WITH TUBAL LIGATION;  Surgeon: Nori Mcmahon MD;  Location: [facility] L&D;  Service: OB/GYN;  Laterality: N/A;    TENDON RELEASE      right 3rd finger    TONSILLECTOMY       Family History   Problem Relation Age of Onset    Hypertension Maternal Grandmother     Diabetes Maternal Grandfather      Social History     Tobacco Use    Smoking status: Never    Smokeless tobacco: Never   Substance Use Topics    Alcohol use: No    Drug use: No     Review of  Systems   Constitutional:  Negative for chills and fever.   HENT:  Negative for congestion, ear pain, rhinorrhea and sore throat.    Eyes:  Negative for redness.   Respiratory:  Negative for cough and shortness of breath.    Cardiovascular:  Negative for chest pain.   Gastrointestinal:  Negative for abdominal pain, diarrhea, nausea and vomiting.   Genitourinary:  Negative for decreased urine volume, difficulty urinating, dysuria, frequency, hematuria and urgency.   Musculoskeletal:  Positive for arthralgias. Negative for back pain and neck pain.   Skin:  Negative for rash.   Neurological:  Negative for headaches.        (-) head trauma  (-) LOC   Psychiatric/Behavioral:  Negative for confusion.        Physical Exam     Initial Vitals [01/20/24 1831]   BP Pulse Resp Temp SpO2   133/85 93 18 98 °F (36.7 °C) 99 %      MAP       --         Physical Exam    Nursing note and vitals reviewed.  Constitutional: She appears well-developed and well-nourished.  Non-toxic appearance. She does not appear ill.   HENT:   Head: Normocephalic and atraumatic.   Right Ear: Hearing, tympanic membrane, external ear and ear canal normal. Tympanic membrane is not perforated, not erythematous and not bulging.   Left Ear: Hearing, tympanic membrane, external ear and ear canal normal. Tympanic membrane is not perforated, not erythematous and not bulging.   Nose: Nose normal.   Mouth/Throat: Uvula is midline, oropharynx is clear and moist and mucous membranes are normal.   Eyes: Conjunctivae and EOM are normal.   Neck: Neck supple.   Normal range of motion.   Full passive range of motion without pain.     Cardiovascular:  Normal rate and regular rhythm.           Pulses:       Radial pulses are 2+ on the right side and 2+ on the left side.        Dorsalis pedis pulses are 2+ on the right side and 2+ on the left side.   Pulmonary/Chest: Effort normal and breath sounds normal. No accessory muscle usage. No respiratory distress. She has no  decreased breath sounds.   Abdominal: Abdomen is soft. Bowel sounds are normal. She exhibits no distension. There is no abdominal tenderness. There is no rebound and no guarding.   Musculoskeletal:         General: Normal range of motion.      Cervical back: Full passive range of motion without pain, normal range of motion and neck supple. No rigidity.      Comments: Full range motion of bilateral toes, ankles, knees, hips.  Strength and sensation intact to bilateral lower extremities.  Tenderness to palpation to left lateral foot.  No surrounding areas of erythema cellulitis.  No evidence of any erythema, edema, bruising, rash, or cellulitis to patient's bilateral lower extremities.  Negative Homans sign bilaterally.  Patient able to ambulate into the room.     Neurological: She is alert. No cranial nerve deficit.   Neuro intact.  Strength and sensation intact bilateral upper and lower extremities.   Skin: Skin is warm and dry.   Psychiatric: She has a normal mood and affect.         ED Course   Procedures  Labs Reviewed   POCT URINE PREGNANCY          Imaging Results              X-Ray Foot Complete Left (Final result)  Result time 01/20/24 19:33:20      Final result by Pool Teresa MD (01/20/24 19:33:20)                   Impression:      Negative left foot and ankle.      Electronically signed by: Pool Teresa  Date:    01/20/2024  Time:    19:33               Narrative:    EXAMINATION:  XR ANKLE COMPLETE 3 VIEW LEFT; XR FOOT COMPLETE 3 VIEW LEFT    CLINICAL HISTORY:  Pain in unspecified ankle and joints of unspecified foot; Pain in unspecified foot    TECHNIQUE:  AP, lateral and oblique views of the left ankle were performed.    COMPARISON:  None    FINDINGS:  Bones, joint spaces and soft tissues appear intact.  No significant soft tissue swelling or foreign body.  Joint spaces maintained.                                       X-Ray Ankle Complete Left (Final result)  Result time 01/20/24 19:33:20       Final result by Pool Teresa MD (01/20/24 19:33:20)                   Impression:      Negative left foot and ankle.      Electronically signed by: Pool Teresa  Date:    01/20/2024  Time:    19:33               Narrative:    EXAMINATION:  XR ANKLE COMPLETE 3 VIEW LEFT; XR FOOT COMPLETE 3 VIEW LEFT    CLINICAL HISTORY:  Pain in unspecified ankle and joints of unspecified foot; Pain in unspecified foot    TECHNIQUE:  AP, lateral and oblique views of the left ankle were performed.    COMPARISON:  None    FINDINGS:  Bones, joint spaces and soft tissues appear intact.  No significant soft tissue swelling or foreign body.  Joint spaces maintained.                                       Medications   ibuprofen tablet 600 mg (600 mg Oral Given 1/20/24 0977)     Medical Decision Making  This is a 29-year-old female with past medical history of anxiety presents to ED for emergent evaluation of bilateral feet pain (L>R) that started 7 days ago.  She reports a cracking sensation to her left ankle with ambulation.  Patient reports working as a  and is constantly on her feet.   On physical exam, patient is well-appearing and in no acute distress.  Nontoxic appearing.  Lungs are clear to auscultation bilaterally.  Abdomen is soft and nontender.  No guarding, rigidity, rebound.  2+ radial pulses bilaterally.  Posterior oropharynx is not erythematous.  No edema or exudate.  Uvula midline.  Bilateral tympanic membrane is normal.  No erythema, bulging, or perforations.  Neuro intact.  Strength and sensation intact bilateral upper and lower extremities.  2+ DP pulses bilaterally.  Full range motion of bilateral toes, ankles, knees, hips.  Strength and sensation intact to bilateral lower extremities.  Tenderness to palpation to left lateral foot.  No surrounding areas of erythema cellulitis.  No evidence of any erythema, edema, bruising, rash, or cellulitis to patient's bilateral lower extremities.  Negative Homans  sign bilaterally.  Patient able to ambulate into the room.  UPT negative.  X-ray revealed no evidence of any acute fractures or dislocations to the left ankle or left foot.  Advised rice therapy upon discharge.  Will discharge patient on anti-inflammatories and a short course of pain medicine.  Urged prompt follow-up with PCP for further evaluation.    Strict return precautions given. I discussed with the patient/family the diagnosis, treatment plan, indications for return to the emergency department, and for expected follow-up. The patient/family verbalized an understanding. The patient/family is asked if there are any questions or concerns. We discuss the case, until all issues are addressed to the patient/family's satisfaction. Patient/family understands and is agreeable to the plan. Patient is stable and ready for discharge.      Amount and/or Complexity of Data Reviewed  Labs: ordered.  Radiology: ordered.    Risk  Prescription drug management.                                      Clinical Impression:  Final diagnoses:  [M79.673] Foot pain  [M25.579] Ankle pain  [M79.672] Left foot pain (Primary)          ED Disposition Condition    Discharge Stable          ED Prescriptions       Medication Sig Dispense Start Date End Date Auth. Provider    ibuprofen (ADVIL,MOTRIN) 600 MG tablet Take 1 tablet (600 mg total) by mouth every 6 (six) hours as needed for Pain or Temperature greater than (100.5 or greater). 30 tablet 1/20/2024 -- Bert Galdamez PA-C    HYDROcodone-acetaminophen (NORCO) 5-325 mg per tablet Take 1 tablet by mouth every 6 (six) hours as needed for Pain. 12 tablet 1/20/2024 -- Bert Galdamez PA-C          Follow-up Information       Follow up With Specialties Details Why Contact Info    [facility] -  Schedule an appointment as soon as possible for a visit in 2 days for further evaluation [address] [place] [address]  [phone]      [facility] - Emergency Dept Emergency Medicine  In 2 days If symptoms worsen [address] [facility] [place] [address] [phone]             Bert Galdamez PA-C  01/20/24 2223       Bert Galdamez PA-C  01/20/24 2223

## 2024-02-06 ENCOUNTER — HOSPITAL ENCOUNTER (EMERGENCY)
Facility: HOSPITAL | Age: 29
Discharge: HOME | End: 2024-02-06
Attending: EMERGENCY MEDICINE
Payer: MEDICAID

## 2024-02-06 VITALS
OXYGEN SATURATION: 99 % | SYSTOLIC BLOOD PRESSURE: 126 MMHG | DIASTOLIC BLOOD PRESSURE: 63 MMHG | HEIGHT: 65 IN | BODY MASS INDEX: 38.32 KG/M2 | HEART RATE: 93 BPM | WEIGHT: 230 LBS | TEMPERATURE: 98 F | RESPIRATION RATE: 20 BRPM

## 2024-02-06 DIAGNOSIS — M79.672 FOOT PAIN, LEFT: Primary | ICD-10-CM

## 2024-02-06 LAB
B-HCG UR QL: NEGATIVE
CTP QC/QA: YES

## 2024-02-06 PROCEDURE — 99283 EMERGENCY DEPT VISIT LOW MDM: CPT

## 2024-02-06 PROCEDURE — 81025 URINE PREGNANCY TEST: CPT | Performed by: EMERGENCY MEDICINE

## 2024-02-06 PROCEDURE — 25000003 PHARM REV CODE 250

## 2024-02-06 RX ORDER — ACETAMINOPHEN 500 MG
1000 TABLET ORAL
Status: COMPLETED | OUTPATIENT
Start: 2024-02-06 | End: 2024-02-06

## 2024-02-06 RX ORDER — ACETAMINOPHEN 500 MG
500 TABLET ORAL EVERY 6 HOURS PRN
Qty: 28 TABLET | Refills: 0 | Status: SHIPPED | OUTPATIENT
Start: 2024-02-06 | End: 2024-02-13

## 2024-02-06 RX ORDER — IBUPROFEN 400 MG/1
800 TABLET ORAL
Status: COMPLETED | OUTPATIENT
Start: 2024-02-06 | End: 2024-02-06

## 2024-02-06 RX ORDER — NAPROXEN 500 MG/1
500 TABLET ORAL 2 TIMES DAILY WITH MEALS
Qty: 10 TABLET | Refills: 0 | Status: SHIPPED | OUTPATIENT
Start: 2024-02-06 | End: 2024-02-11

## 2024-02-06 RX ADMIN — ACETAMINOPHEN 1000 MG: 500 TABLET ORAL at 11:02

## 2024-02-06 RX ADMIN — IBUPROFEN 800 MG: 400 TABLET ORAL at 11:02

## 2024-02-06 NOTE — DISCHARGE INSTRUCTIONS

## 2024-02-06 NOTE — ED PROVIDER NOTES
Encounter Date: 2024       History     Chief Complaint   Patient presents with    Foot Pain     Reports follow up for L foot, repots seen a few days ago for same s/s but has not been able to do follow up appt. Reports pain is increasing. Denies meds pta.     Tanisha Victoria is a 28 y/o female with no pertinent past medical history who presents to the emergency department for left foot pain that started approximately 2024. Denies trauma, injury, or any known inciting incident. Patient was initially seen for symptoms on 24 where she underwent a left ankle/foot x-ray with no evidence of fracture or acute dislocation. Patient reports increasing pain the last two days. Pain described as an intermittently dull, at times a sharp pain in her toes and plantar/dorsal foot. Patient also reports associated muscle spasms in the toes, foot, and left leg. Denies left calf pain. Denies fever, N/V/D. Denies chest pain or SOB. Denies any other symptoms. Patient reports minimal benefit with prescribed ibuprofen and hydrocodone 5-325 mg. Patient was advised to follow up with [facility], however patient has not tried to make an appointment yet.    The history is provided by the patient. No  was used.     Review of patient's allergies indicates:  No Known Allergies  Past Medical History:   Diagnosis Date    Anxiety      Past Surgical History:   Procedure Laterality Date     SECTION       SECTION N/A 2018    Procedure:  SECTION;  Surgeon: Khushi Vick MD;  Location: [facility] L&D;  Service: OB/GYN;  Laterality: N/A;     SECTION WITH TUBAL LIGATION N/A 2019    Procedure:  SECTION, WITH TUBAL LIGATION;  Surgeon: Nori Mcmahon MD;  Location: [facility] L&D;  Service: OB/GYN;  Laterality: N/A;    TENDON RELEASE      right 3rd finger    TONSILLECTOMY       Family History   Problem Relation Age of Onset    Hypertension Maternal Grandmother      Diabetes Maternal Grandfather      Social History     Tobacco Use    Smoking status: Never    Smokeless tobacco: Never   Substance Use Topics    Alcohol use: No    Drug use: No     Review of Systems   Constitutional:  Negative for fatigue and fever.   HENT:  Negative for sore throat.    Respiratory:  Negative for cough and shortness of breath.    Cardiovascular:  Negative for chest pain and leg swelling.   Gastrointestinal:  Negative for abdominal pain, diarrhea, nausea and vomiting.   Genitourinary:  Negative for dysuria and hematuria.   Musculoskeletal:  Positive for arthralgias (Left foot). Negative for back pain and neck pain.   Skin:  Negative for color change, pallor, rash and wound.   Neurological:  Negative for weakness, numbness and headaches.   Hematological:  Does not bruise/bleed easily.       Physical Exam     Initial Vitals [02/06/24 1011]   BP Pulse Resp Temp SpO2   126/63 93 20 97.8 °F (36.6 °C) 99 %      MAP       --         Physical Exam    Nursing note and vitals reviewed.  Constitutional: Vital signs are normal. She appears well-developed and well-nourished. She is not diaphoretic. She is cooperative. She does not appear ill. No distress.   HENT:   Head: Normocephalic and atraumatic.   Right Ear: Hearing, tympanic membrane, external ear and ear canal normal.   Left Ear: Hearing, tympanic membrane, external ear and ear canal normal.   Nose: Rhinorrhea present. No mucosal edema or sinus tenderness. Right sinus exhibits no maxillary sinus tenderness and no frontal sinus tenderness. Left sinus exhibits no maxillary sinus tenderness and no frontal sinus tenderness.   Mouth/Throat: Mucous membranes are normal. No posterior oropharyngeal edema, posterior oropharyngeal erythema or tonsillar abscesses.   Eyes: Conjunctivae and EOM are normal. Pupils are equal, round, and reactive to light.   Neck: Phonation normal. Neck supple.   Normal range of motion.   Full passive range of motion without pain.      Cardiovascular:  Normal rate, regular rhythm, S1 normal, S2 normal, normal heart sounds and normal pulses.           No murmur heard.  Pulses:       Radial pulses are 2+ on the right side and 2+ on the left side.        Dorsalis pedis pulses are 2+ on the right side and 2+ on the left side.   Pulmonary/Chest: Effort normal and breath sounds normal. No accessory muscle usage. No respiratory distress.   Abdominal: Abdomen is soft and flat. She exhibits no distension. There is no abdominal tenderness.   Musculoskeletal:         General: Normal range of motion.      Cervical back: Full passive range of motion without pain, normal range of motion and neck supple. No edema or rigidity. No muscular tenderness. Normal range of motion.      Comments: No significant tenderness in either ankle or foot.  No unilateral swelling, edema, erythema, or tenderness.  Negative Homans sign bilaterally.  DP and PT pulses palpable and symmetrical in bilateral lower extremities.  Patient was ambulatory.  Sensation intact to all 10 toes.  Full range of motion of the bilateral ankles and toes.  Patient reports some discomfort with range of motion of the left ankle.     Neurological: She is alert and oriented to person, place, and time. GCS eye subscore is 4. GCS verbal subscore is 5. GCS motor subscore is 6.   Skin: Skin is warm and dry. Capillary refill takes less than 2 seconds. No abrasion, no lesion and no rash noted.         ED Course   Procedures  Labs Reviewed   POCT URINE PREGNANCY          Imaging Results    None          Medications   acetaminophen tablet 1,000 mg (1,000 mg Oral Given 2/6/24 1120)   ibuprofen tablet 800 mg (800 mg Oral Given 2/6/24 1120)     Medical Decision Making  29-year-old female presenting to the emergency department with a chief complaint of atraumatic left foot pain.  Was seen here previously for the same complaint but has not had any significant relief.  No new falls or trauma.  On physical exam, patient  was clinically well-appearing and in no acute distress.  She was ambulatory.  Neurovascularly intact in bilateral lower extremities.  No deformity, ecchymosis, swelling.    Differential diagnosis includes but is not limited to DVT, contusion, strain, sprain, fracture, dislocation, or ligamentous injury of the affected ankle or foot.    UPT was negative.  With recent plain films of the foot and ankle and no new injury, no utility in repeating them today.  I reiterated the importance of rest, ice, compression, and elevation for conservative management of this type of pain.  Also suggested getting orthotics or other shoe inserts because the patient has very flat feet and wears shoes with no support.  Urged prompt follow up with [facility].  The phone number was provided to the patient.  Doubt DVT given lack of physical exam findings, no significant risk factors.    Amount and/or Complexity of Data Reviewed  Labs: ordered. Decision-making details documented in ED Course.    Risk  OTC drugs.  Prescription drug management.  Diagnosis or treatment significantly limited by social determinants of health.                                      Clinical Impression:  Final diagnoses:  [M79.672] Foot pain, left (Primary)          ED Disposition Condition    Discharge Stable          ED Prescriptions       Medication Sig Dispense Start Date End Date Auth. Provider    acetaminophen (TYLENOL) 500 MG tablet Take 1 tablet (500 mg total) by mouth every 6 (six) hours as needed. 28 tablet 2/6/2024 2/13/2024 Mikey Kaba, SHARRI    naproxen (NAPROSYN) 500 MG tablet Take 1 tablet (500 mg total) by mouth 2 (two) times daily with meals. for 5 days 10 tablet 2/6/2024 2/11/2024 Mikey Kaba, PAROGELIO          Follow-up Information       Follow up With Specialties Details Why Contact Info    [facility] -  Schedule an appointment as soon as possible for a visit   [address]  [place] [address]  [phone]                Mikey Kaba, PA-C  02/06/24 0136

## 2024-11-05 NOTE — PROGRESS NOTES
No complaints today.  Occ irreg ctx but no LOF or VB.  +FM.  H/o PTD, currently on 17-OHP but has missed multiple doses. Taking PNV.    UA: + nitrites, cx pending    A/P:  25yo  at 36w5d, insufficient PNC   - h/o PTD at 35w5d, currently on 17-OHP but noncompliant with weekly injections.  Currently no s/sx of PTL.  Strict precautions given.  - prev c/s x 3, desires repeat.  Desires BTL.  Discussed LARC and risk of regret at a young age with BTL.  Pt declines, adamantly desires BLT.  Consents signed previously.  RCD/BTL scheduled  at 9am.  - Continue daily PNV.     - Counseling done, precautions given, all questions answered.  RTC 1 wk for OB visit.   Quality 226: Preventive Care And Screening: Tobacco Use: Screening And Cessation Intervention: Patient screened for tobacco use and is an ex/non-smoker Detail Level: Detailed Quality 47: Advance Care Plan: Advance Care Planning discussed and documented in the medical record; patient did not wish or was not able to name a surrogate decision maker or provide an advance care plan.

## 2025-04-22 ENCOUNTER — HOSPITAL ENCOUNTER (EMERGENCY)
Facility: HOSPITAL | Age: 30
Discharge: HOME | End: 2025-04-22
Attending: EMERGENCY MEDICINE

## 2025-04-22 VITALS
HEART RATE: 67 BPM | WEIGHT: 225 LBS | HEIGHT: 65 IN | RESPIRATION RATE: 18 BRPM | DIASTOLIC BLOOD PRESSURE: 71 MMHG | OXYGEN SATURATION: 99 % | BODY MASS INDEX: 37.49 KG/M2 | SYSTOLIC BLOOD PRESSURE: 128 MMHG | TEMPERATURE: 98 F

## 2025-04-22 DIAGNOSIS — G43.019 INTRACTABLE MIGRAINE WITHOUT AURA AND WITHOUT STATUS MIGRAINOSUS: Primary | ICD-10-CM

## 2025-04-22 DIAGNOSIS — Z86.59 HISTORY OF RECENT STRESSFUL LIFE EVENT: ICD-10-CM

## 2025-04-22 LAB
ABSOLUTE EOSINOPHIL (OHS): 0.19 K/UL
ABSOLUTE MONOCYTE (OHS): 0.76 K/UL (ref 0.3–1)
ABSOLUTE NEUTROPHIL COUNT (OHS): 7.06 K/UL (ref 1.8–7.7)
ALBUMIN SERPL BCP-MCNC: 3.6 G/DL (ref 3.5–5.2)
ALP SERPL-CCNC: 70 UNIT/L (ref 40–150)
ALT SERPL W/O P-5'-P-CCNC: 16 UNIT/L (ref 10–44)
ANION GAP (OHS): 10 MMOL/L (ref 8–16)
AST SERPL-CCNC: 13 UNIT/L (ref 11–45)
B-HCG UR QL: NEGATIVE
BACTERIA #/AREA URNS AUTO: ABNORMAL /HPF
BASOPHILS # BLD AUTO: 0.07 K/UL
BASOPHILS NFR BLD AUTO: 0.6 %
BILIRUB SERPL-MCNC: 0.1 MG/DL (ref 0.1–1)
BILIRUB UR QL STRIP.AUTO: NEGATIVE
BUN SERPL-MCNC: 15 MG/DL (ref 6–20)
CALCIUM SERPL-MCNC: 9.2 MG/DL (ref 8.7–10.5)
CHLORIDE SERPL-SCNC: 108 MMOL/L (ref 95–110)
CLARITY UR: ABNORMAL
CO2 SERPL-SCNC: 22 MMOL/L (ref 23–29)
COLOR UR AUTO: YELLOW
CREAT SERPL-MCNC: 0.7 MG/DL (ref 0.5–1.4)
CTP QC/QA: YES
ERYTHROCYTE [DISTWIDTH] IN BLOOD BY AUTOMATED COUNT: 12.7 % (ref 11.5–14.5)
GFR SERPLBLD CREATININE-BSD FMLA CKD-EPI: >60 ML/MIN/1.73/M2
GLUCOSE SERPL-MCNC: 96 MG/DL (ref 70–110)
GLUCOSE UR QL STRIP: NEGATIVE
HCT VFR BLD AUTO: 41.5 % (ref 37–48.5)
HGB BLD-MCNC: 13.3 GM/DL (ref 12–16)
HGB UR QL STRIP: ABNORMAL
HOLD SPECIMEN: NORMAL
IMM GRANULOCYTES # BLD AUTO: 0.05 K/UL (ref 0–0.04)
IMM GRANULOCYTES NFR BLD AUTO: 0.4 % (ref 0–0.5)
KETONES UR QL STRIP: NEGATIVE
LEUKOCYTE ESTERASE UR QL STRIP: NEGATIVE
LYMPHOCYTES # BLD AUTO: 3.33 K/UL (ref 1–4.8)
MAGNESIUM SERPL-MCNC: 1.9 MG/DL (ref 1.6–2.6)
MCH RBC QN AUTO: 29 PG (ref 27–31)
MCHC RBC AUTO-ENTMCNC: 32 G/DL (ref 32–36)
MCV RBC AUTO: 91 FL (ref 82–98)
MICROSCOPIC COMMENT: ABNORMAL
NITRITE UR QL STRIP: NEGATIVE
NUCLEATED RBC (/100WBC) (OHS): 0 /100 WBC
PH UR STRIP: 6 [PH]
PLATELET # BLD AUTO: 382 K/UL (ref 150–450)
PMV BLD AUTO: 9.5 FL (ref 9.2–12.9)
POTASSIUM SERPL-SCNC: 4.1 MMOL/L (ref 3.5–5.1)
PROT SERPL-MCNC: 7.8 GM/DL (ref 6–8.4)
PROT UR QL STRIP: ABNORMAL
RBC # BLD AUTO: 4.58 M/UL (ref 4–5.4)
RBC #/AREA URNS AUTO: 25 /HPF (ref 0–4)
RELATIVE EOSINOPHIL (OHS): 1.7 %
RELATIVE LYMPHOCYTE (OHS): 29.1 % (ref 18–48)
RELATIVE MONOCYTE (OHS): 6.6 % (ref 4–15)
RELATIVE NEUTROPHIL (OHS): 61.6 % (ref 38–73)
SODIUM SERPL-SCNC: 140 MMOL/L (ref 136–145)
SP GR UR STRIP: >=1.03
SQUAMOUS #/AREA URNS AUTO: 4 /HPF
UROBILINOGEN UR STRIP-ACNC: NEGATIVE EU/DL
WBC # BLD AUTO: 11.46 K/UL (ref 3.9–12.7)
WBC #/AREA URNS AUTO: 5 /HPF (ref 0–5)
WBC CLUMPS UR QL AUTO: ABNORMAL

## 2025-04-22 PROCEDURE — 81025 URINE PREGNANCY TEST: CPT | Performed by: PHYSICIAN ASSISTANT

## 2025-04-22 PROCEDURE — 25000003 PHARM REV CODE 250

## 2025-04-22 PROCEDURE — 83735 ASSAY OF MAGNESIUM: CPT

## 2025-04-22 PROCEDURE — 99284 EMERGENCY DEPT VISIT MOD MDM: CPT | Mod: 25

## 2025-04-22 PROCEDURE — 85025 COMPLETE CBC W/AUTO DIFF WBC: CPT

## 2025-04-22 PROCEDURE — 96361 HYDRATE IV INFUSION ADD-ON: CPT

## 2025-04-22 PROCEDURE — 96374 THER/PROPH/DIAG INJ IV PUSH: CPT

## 2025-04-22 PROCEDURE — 81003 URINALYSIS AUTO W/O SCOPE: CPT

## 2025-04-22 PROCEDURE — 96375 TX/PRO/DX INJ NEW DRUG ADDON: CPT

## 2025-04-22 PROCEDURE — 63600175 PHARM REV CODE 636 W HCPCS: Mod: JZ,TB

## 2025-04-22 PROCEDURE — 80053 COMPREHEN METABOLIC PANEL: CPT

## 2025-04-22 RX ORDER — ONDANSETRON 4 MG/1
4 TABLET, ORALLY DISINTEGRATING ORAL 3 TIMES DAILY PRN
Qty: 15 TABLET | Refills: 0 | Status: SHIPPED | OUTPATIENT
Start: 2025-04-22

## 2025-04-22 RX ORDER — DEXAMETHASONE SODIUM PHOSPHATE 4 MG/ML
4 INJECTION, SOLUTION INTRA-ARTICULAR; INTRALESIONAL; INTRAMUSCULAR; INTRAVENOUS; SOFT TISSUE
Status: COMPLETED | OUTPATIENT
Start: 2025-04-22 | End: 2025-04-22

## 2025-04-22 RX ORDER — KETOROLAC TROMETHAMINE 30 MG/ML
15 INJECTION, SOLUTION INTRAMUSCULAR; INTRAVENOUS
Status: COMPLETED | OUTPATIENT
Start: 2025-04-22 | End: 2025-04-22

## 2025-04-22 RX ORDER — DIPHENHYDRAMINE HYDROCHLORIDE 50 MG/ML
25 INJECTION, SOLUTION INTRAMUSCULAR; INTRAVENOUS
Status: COMPLETED | OUTPATIENT
Start: 2025-04-22 | End: 2025-04-22

## 2025-04-22 RX ORDER — METOCLOPRAMIDE HYDROCHLORIDE 5 MG/ML
10 INJECTION INTRAMUSCULAR; INTRAVENOUS
Status: COMPLETED | OUTPATIENT
Start: 2025-04-22 | End: 2025-04-22

## 2025-04-22 RX ORDER — NAPROXEN 500 MG/1
500 TABLET ORAL 2 TIMES DAILY PRN
Qty: 20 TABLET | Refills: 0 | Status: SHIPPED | OUTPATIENT
Start: 2025-04-22

## 2025-04-22 RX ADMIN — DEXAMETHASONE SODIUM PHOSPHATE 4 MG: 4 INJECTION INTRA-ARTICULAR; INTRALESIONAL; INTRAMUSCULAR; INTRAVENOUS; SOFT TISSUE at 11:04

## 2025-04-22 RX ADMIN — KETOROLAC TROMETHAMINE 15 MG: 30 INJECTION, SOLUTION INTRAMUSCULAR; INTRAVENOUS at 09:04

## 2025-04-22 RX ADMIN — SODIUM CHLORIDE 1000 ML: 9 INJECTION, SOLUTION INTRAVENOUS at 09:04

## 2025-04-22 RX ADMIN — DIPHENHYDRAMINE HYDROCHLORIDE 25 MG: 50 INJECTION INTRAMUSCULAR; INTRAVENOUS at 09:04

## 2025-04-22 RX ADMIN — METOCLOPRAMIDE 10 MG: 5 INJECTION, SOLUTION INTRAMUSCULAR; INTRAVENOUS at 09:04

## 2025-04-23 NOTE — ED PROVIDER NOTES
Encounter Date: 2025       History     Chief Complaint   Patient presents with    Headache     Pt arrived in ED, c/o intermittent HA x 5 days. Pt reports she feels they're stress induced. Pt denies any SI/HI. Pt denies any dizziness or vision changes. Pt denies any CP, SOB, abd pain or n/v/d     30 y.o. female with PMHx of anxiety presents for emergent evaluation of left-sided headache X 5 days.  She states that the headache began to the left frontal area and behind her eye and has progressively worsened and now radiates to the back of her head.  She states that it was a slight ache but has since progressively worsened currently rating pain 8/10 with associated photophobia and nausea.  She reports increased stress over the last week due to taking care of her daughter which she believes likely contributing to the headache.  She denies any recent falls, head trauma, vision changes, confusion, dizziness or difficulty ambulating.  She was attempted treatment with Tylenol, ibuprofen and Advil which was given minor relief the 1st day or 2 of the headache but has since not improved symptoms at all.  She is tolerating PO.  Patient denies fever/chills, chest pain, shortness of breath, abdominal pain, nausea/vomiting/diarrhea, urinary concerns, myalgias, or any other complaints at this time.     The history is provided by the patient.     Review of patient's allergies indicates:  No Known Allergies  Past Medical History:   Diagnosis Date    Anxiety      Past Surgical History:   Procedure Laterality Date     SECTION       SECTION N/A 2018    Procedure:  SECTION;  Surgeon: Khushi Vick MD;  Location: [facility] L&D;  Service: OB/GYN;  Laterality: N/A;     SECTION WITH TUBAL LIGATION N/A 2019    Procedure:  SECTION, WITH TUBAL LIGATION;  Surgeon: Nori Mcmahon MD;  Location: [facility] L&D;  Service: OB/GYN;  Laterality: N/A;    TENDON RELEASE      right 3rd finger     TONSILLECTOMY       Family History   Problem Relation Name Age of Onset    Hypertension Maternal Grandmother      Diabetes Maternal Grandfather       Social History[1]  Review of Systems   Constitutional:  Negative for chills and fever.   HENT:  Negative for congestion, facial swelling and sore throat.    Eyes:  Negative for photophobia, pain, redness and visual disturbance.   Respiratory:  Negative for shortness of breath.    Cardiovascular:  Negative for chest pain.   Gastrointestinal:  Negative for abdominal pain, diarrhea, nausea and vomiting.   Genitourinary:  Negative for decreased urine volume, dysuria and hematuria.   Musculoskeletal:  Negative for myalgias.   Skin:  Negative for rash.   Neurological:  Positive for headaches. Negative for dizziness, seizures, syncope, facial asymmetry, weakness, light-headedness and numbness.   Psychiatric/Behavioral: Negative.         Physical Exam     Initial Vitals [04/22/25 1928]   BP Pulse Resp Temp SpO2   137/89 83 18 98.4 °F (36.9 °C) 98 %      MAP       --         Physical Exam    Nursing note and vitals reviewed.  Constitutional: She appears well-developed and well-nourished. She is not diaphoretic. No distress.   HENT:   Head: Normocephalic and atraumatic.   Right Ear: External ear normal.   Left Ear: External ear normal. Mouth/Throat: Oropharynx is clear and moist.   Eyes: Conjunctivae and EOM are normal. Pupils are equal, round, and reactive to light. No scleral icterus.   Neck: Neck supple.   Normal range of motion.  Cardiovascular:  Normal rate.           Pulmonary/Chest: Breath sounds normal. No stridor. No respiratory distress.   Abdominal: Abdomen is soft. Bowel sounds are normal. She exhibits no distension. There is no abdominal tenderness. There is no rebound.   Musculoskeletal:         General: Normal range of motion.      Cervical back: Normal range of motion and neck supple.     Lymphadenopathy:     She has no cervical adenopathy.   Neurological: She is  alert and oriented to person, place, and time. She has normal strength. No cranial nerve deficit or sensory deficit. GCS score is 15. GCS eye subscore is 4. GCS verbal subscore is 5. GCS motor subscore is 6.   PERRL, EOMI w/out evidence of nystagmus, No deficits appreciated to light touch bilateral face, No facial weakness, no facial asymmetry. Eyebrow raise symmetric. Smile symmetric, Palate midline, and raises symmetrically, Shoulder shrug 5/5 bilaterally, tongue is midline w/out asymmetry. Strength 5/5 to bilateral upper and lower extremities, sensation intact to light touch    Skin: No rash noted.   Psychiatric: She has a normal mood and affect. Thought content normal.         ED Course   Procedures  Labs Reviewed   COMPREHENSIVE METABOLIC PANEL - Abnormal       Result Value    Sodium 140      Potassium 4.1      Chloride 108      CO2 22 (*)     Glucose 96      BUN 15      Creatinine 0.7      Calcium 9.2      Protein Total 7.8      Albumin 3.6      Bilirubin Total 0.1      ALP 70      AST 13      ALT 16      Anion Gap 10      eGFR >60     URINALYSIS, REFLEX TO URINE CULTURE - Abnormal    Color, UA Yellow      Appearance, UA Hazy (*)     pH, UA 6.0      Spec Grav UA >=1.030 (*)     Protein, UA Trace (*)     Glucose, UA Negative      Ketones, UA Negative      Bilirubin, UA Negative      Blood, UA 3+ (*)     Nitrites, UA Negative      Urobilinogen, UA Negative      Leukocyte Esterase, UA Negative     CBC WITH DIFFERENTIAL - Abnormal    WBC 11.46      RBC 4.58      HGB 13.3      HCT 41.5      MCV 91      MCH 29.0      MCHC 32.0      RDW 12.7      Platelet Count 382      MPV 9.5      Nucleated RBC 0      Neut % 61.6      Lymph % 29.1      Mono % 6.6      Eos % 1.7      Basophil % 0.6      Imm Grans % 0.4      Neut # 7.06      Lymph # 3.33      Mono # 0.76      Eos # 0.19      Baso # 0.07      Imm Grans # 0.05 (*)    URINALYSIS MICROSCOPIC - Abnormal    RBC, UA 25 (*)     WBC, UA 5      WBC Clumps, UA Rare       Bacteria, UA Occasional      Squamous Epithelial Cells, UA 4      Microscopic Comment       MAGNESIUM - Normal    Magnesium  1.9     CBC W/ AUTO DIFFERENTIAL    Narrative:     The following orders were created for panel order CBC auto differential.  Procedure                               Abnormality         Status                     ---------                               -----------         ------                     CBC with Differential[[id number]]        Abnormal            Final result                 Please view results for these tests on the individual orders.   GREY TOP URINE HOLD    Extra Tube Hold for add-ons.     POCT URINE PREGNANCY    POC Preg Test, Ur Negative       Acceptable Yes            Imaging Results    None          Medications   sodium chloride 0.9% bolus 1,000 mL 1,000 mL (0 mLs Intravenous Stopped 4/22/25 2254)   ketorolac injection 15 mg (15 mg Intravenous Given 4/22/25 2154)   metoclopramide injection 10 mg (10 mg Intravenous Given 4/22/25 2154)   diphenhydrAMINE injection 25 mg (25 mg Intravenous Given 4/22/25 2154)   dexAMETHasone injection 4 mg (4 mg Intravenous Given 4/22/25 2307)     Medical Decision Making  This is an emergent evaluation of a 30 y.o. female presenting to the ED for gradual onset of left-sided headache with associated nausea and photophobia.  Denies traumatic injury, LOC, emesis, and seizure. Also denies fever, neck rigidity, sinus pain, dental pain, and otalgia. No personal history of cancer. Afebrile. On physical exam, she has no focal weakness or neurological deficits. Has full ROM of neck with no nuchal rigidity or meningeal signs. There is no staggering or ataxic gait, vomiting, double vision, visual loss, slurred speech, numbness of the face or body, weakness, clumsiness, or incoordination.    Given trial of medication in ED with complete resolution of symptoms.  Labs reassuring.  SEE ED COURSE.  Remains well appearing. Vitals stable.      Presentation most consistent with acute migraine likely secondary to increased life stressors.  Patient denies any SI/HI reports good support at home.No clinical Hx or findings to suggest intracranial hemorrhage or warrant emergent imaging at this time. Denies head injury concerning for epidural/subdural hematoma. Not consistent with infectious etiology, including for meningitis, acute bacterial sinusitis, dental abscess, AOM, and mastoiditis. I considered but have a lower suspicion for neoplastic etiology. Normal neuro exam making CVA less likely.  No changes in vision, temporal pain and headache is not specifically unilateral which would be more indicative of temporal arteritis.  It was not consistent and eye exam is within normal limits concerning glaucoma.     Discharged home with supportive care. Advising follow up with PCP and discussed strict return precautions.     I discussed with the patient the diagnosis, treatment plan, indications for return to the emergency department, and for expected follow-up. The patient verbalized an understanding. The patient is asked if there are any questions or concerns. We discuss the case, until all issues are addressed to the patient's satisfaction. Patient understands and is agreeable to the plan.     Amount and/or Complexity of Data Reviewed  External Data Reviewed: labs and notes.  Labs: ordered. Decision-making details documented in ED Course.    Risk  OTC drugs.  Prescription drug management.  Diagnosis or treatment significantly limited by social determinants of health.               ED Course as of 04/23/25 1900   Tue Apr 22, 2025 2116 hCG Qualitative, Urine: Negative [CC]   2243 CBC auto differential(!) [CC]   2243 Comprehensive metabolic panel(!) [CC]   2243 CBC without leukocytosis.  Hemoglobin and hematocrit stable. CMP unremarkable without significant electrolyte derangement, impaired renal function, or elevated LFTs  [CC]   2252 RBC, UA(!): 25  Patient  currently on menstrual cycle [CC]   2253 Patient reports complete resolution of headache and nausea.  States she was feeling her normal self.  Remains neurologically intact.  Tolerating PO.  We will administer IV dexamethasone to prevent rebound headaches, repeat vitals and plan for discharge [CC]      ED Course User Index  [CC] Yvette Mcpherson PA-C                           Clinical Impression:  Final diagnoses:  [G43.019] Intractable migraine without aura and without status migrainosus (Primary)  [Z86.59] History of recent stressful life event          ED Disposition Condition    Discharge Good          ED Prescriptions       Medication Sig Dispense Start Date End Date Auth. Provider    ondansetron (ZOFRAN-ODT) 4 MG TbDL Take 1 tablet (4 mg total) by mouth 3 (three) times daily as needed (nausea). 15 tablet 4/22/2025 -- Yvette Mcpherson PA-C    naproxen (NAPROSYN) 500 MG tablet Take 1 tablet (500 mg total) by mouth 2 (two) times daily as needed (pain). 20 tablet 4/22/2025 -- Yvette Mcpherson PA-C          Follow-up Information       Follow up With Specialties Details Why Contact Info    [facility] -  Schedule an appointment as soon as possible for a visit   [address] [place] [address]  [phone]      [facility] Emergency Dept Emergency Medicine Go to  For new or worsening symptoms [address] [facility] [place] [address] [phone]               [1]   Social History  Tobacco Use    Smoking status: Never    Smokeless tobacco: Never   Substance Use Topics    Alcohol use: No    Drug use: No        Yvette Mcpherson PA-C  04/23/25 1900

## 2025-04-23 NOTE — DISCHARGE INSTRUCTIONS
Problem Specific Instructions:  If you are having recurrent headaches, please start a journal and take note of the time of day, when they start, what helps them, and what associated symptoms there are, etc. and follow up with your primary care provider.  Take naproxen as needed for headaches.  You may rotate this with Tylenol 500-1000mg every 6 hours as needed.  Be sure you are drinking plenty of water and getting adequate rest.  Return to the Emergency Department if you experience worsening or uncontrolled pain, vision changes, recurrent vomiting, difficulty with normal activities, abnormal behavior, difficulty walking, numbness, weakness, or any other concerning symptoms.     If you received or are discharged with pain medicine or muscle relaxers, understand that they can make you sleepy or impair your judgement. Do not make important decisions, drink, drive, swim or perform any other tasks you would not otherwise perform while impaired for at least 24 hours after your last dose.      Ensure you follow up with your Primary Care Provider or any additional providers listed on this discharge sheet. While you may be healthy enough to go home today, I cannot predict the exact course of your diagnoses. It is important to remember that some problems are difficult to diagnose and may not be found during your first visit. As such, it is your responsibility to monitor symptoms, follow-up with another healthcare provider, or return to the emergency room for new or worsening concerns. Unless otherwise instructed, continue all home medications and any new medications prescribed to you in the Emergency Department.

## 2025-04-25 ENCOUNTER — HOSPITAL ENCOUNTER (EMERGENCY)
Facility: HOSPITAL | Age: 30
Discharge: HOME | End: 2025-04-26
Attending: EMERGENCY MEDICINE

## 2025-04-25 DIAGNOSIS — G43.809 OTHER MIGRAINE WITHOUT STATUS MIGRAINOSUS, NOT INTRACTABLE: Primary | ICD-10-CM

## 2025-04-25 PROCEDURE — 99285 EMERGENCY DEPT VISIT HI MDM: CPT | Mod: 25

## 2025-04-25 RX ORDER — KETOROLAC TROMETHAMINE 30 MG/ML
15 INJECTION, SOLUTION INTRAMUSCULAR; INTRAVENOUS
Status: COMPLETED | OUTPATIENT
Start: 2025-04-26 | End: 2025-04-26

## 2025-04-25 RX ORDER — DIPHENHYDRAMINE HYDROCHLORIDE 50 MG/ML
25 INJECTION, SOLUTION INTRAMUSCULAR; INTRAVENOUS
Status: COMPLETED | OUTPATIENT
Start: 2025-04-26 | End: 2025-04-26

## 2025-04-25 RX ORDER — PROCHLORPERAZINE EDISYLATE 5 MG/ML
10 INJECTION INTRAMUSCULAR; INTRAVENOUS ONCE
Status: COMPLETED | OUTPATIENT
Start: 2025-04-26 | End: 2025-04-26

## 2025-04-26 VITALS
TEMPERATURE: 98 F | RESPIRATION RATE: 18 BRPM | BODY MASS INDEX: 38.32 KG/M2 | HEART RATE: 64 BPM | HEIGHT: 65 IN | DIASTOLIC BLOOD PRESSURE: 59 MMHG | WEIGHT: 230 LBS | SYSTOLIC BLOOD PRESSURE: 119 MMHG | OXYGEN SATURATION: 98 %

## 2025-04-26 LAB
ABSOLUTE EOSINOPHIL (OHS): 0.15 K/UL
ABSOLUTE MONOCYTE (OHS): 0.72 K/UL (ref 0.3–1)
ABSOLUTE NEUTROPHIL COUNT (OHS): 6.82 K/UL (ref 1.8–7.7)
ALBUMIN SERPL BCP-MCNC: 3.7 G/DL (ref 3.5–5.2)
ALP SERPL-CCNC: 62 UNIT/L (ref 40–150)
ALT SERPL W/O P-5'-P-CCNC: 14 UNIT/L (ref 10–44)
ANION GAP (OHS): 8 MMOL/L (ref 8–16)
AST SERPL-CCNC: 14 UNIT/L (ref 11–45)
BASOPHILS # BLD AUTO: 0.05 K/UL
BASOPHILS NFR BLD AUTO: 0.5 %
BILIRUB SERPL-MCNC: 0.1 MG/DL (ref 0.1–1)
BUN SERPL-MCNC: 14 MG/DL (ref 6–20)
CALCIUM SERPL-MCNC: 9 MG/DL (ref 8.7–10.5)
CHLORIDE SERPL-SCNC: 105 MMOL/L (ref 95–110)
CO2 SERPL-SCNC: 28 MMOL/L (ref 23–29)
CREAT SERPL-MCNC: 0.8 MG/DL (ref 0.5–1.4)
ERYTHROCYTE [DISTWIDTH] IN BLOOD BY AUTOMATED COUNT: 12.8 % (ref 11.5–14.5)
GFR SERPLBLD CREATININE-BSD FMLA CKD-EPI: >60 ML/MIN/1.73/M2
GLUCOSE SERPL-MCNC: 100 MG/DL (ref 70–110)
HCT VFR BLD AUTO: 39.4 % (ref 37–48.5)
HGB BLD-MCNC: 13.2 GM/DL (ref 12–16)
IMM GRANULOCYTES # BLD AUTO: 0.06 K/UL (ref 0–0.04)
IMM GRANULOCYTES NFR BLD AUTO: 0.5 % (ref 0–0.5)
LYMPHOCYTES # BLD AUTO: 3.21 K/UL (ref 1–4.8)
MCH RBC QN AUTO: 29.7 PG (ref 27–31)
MCHC RBC AUTO-ENTMCNC: 33.5 G/DL (ref 32–36)
MCV RBC AUTO: 89 FL (ref 82–98)
NUCLEATED RBC (/100WBC) (OHS): 0 /100 WBC
PLATELET # BLD AUTO: 390 K/UL (ref 150–450)
PMV BLD AUTO: 9.4 FL (ref 9.2–12.9)
POTASSIUM SERPL-SCNC: 3.8 MMOL/L (ref 3.5–5.1)
PROT SERPL-MCNC: 7.8 GM/DL (ref 6–8.4)
RBC # BLD AUTO: 4.44 M/UL (ref 4–5.4)
RELATIVE EOSINOPHIL (OHS): 1.4 %
RELATIVE LYMPHOCYTE (OHS): 29.2 % (ref 18–48)
RELATIVE MONOCYTE (OHS): 6.5 % (ref 4–15)
RELATIVE NEUTROPHIL (OHS): 61.9 % (ref 38–73)
SODIUM SERPL-SCNC: 141 MMOL/L (ref 136–145)
WBC # BLD AUTO: 11.01 K/UL (ref 3.9–12.7)

## 2025-04-26 PROCEDURE — 96375 TX/PRO/DX INJ NEW DRUG ADDON: CPT

## 2025-04-26 PROCEDURE — 85025 COMPLETE CBC W/AUTO DIFF WBC: CPT | Performed by: EMERGENCY MEDICINE

## 2025-04-26 PROCEDURE — 80053 COMPREHEN METABOLIC PANEL: CPT | Performed by: EMERGENCY MEDICINE

## 2025-04-26 PROCEDURE — 63600175 PHARM REV CODE 636 W HCPCS: Performed by: EMERGENCY MEDICINE

## 2025-04-26 PROCEDURE — 96374 THER/PROPH/DIAG INJ IV PUSH: CPT

## 2025-04-26 RX ORDER — BUTALBITAL, ACETAMINOPHEN AND CAFFEINE 300; 40; 50 MG/1; MG/1; MG/1
1 CAPSULE ORAL EVERY 8 HOURS PRN
Qty: 14 CAPSULE | Refills: 0 | Status: SHIPPED | OUTPATIENT
Start: 2025-04-26 | End: 2025-04-26

## 2025-04-26 RX ORDER — ONDANSETRON 4 MG/1
4 TABLET, ORALLY DISINTEGRATING ORAL EVERY 8 HOURS PRN
Qty: 20 TABLET | Refills: 0 | Status: SHIPPED | OUTPATIENT
Start: 2025-04-26

## 2025-04-26 RX ORDER — DEXAMETHASONE SODIUM PHOSPHATE 4 MG/ML
8 INJECTION, SOLUTION INTRA-ARTICULAR; INTRALESIONAL; INTRAMUSCULAR; INTRAVENOUS; SOFT TISSUE
Status: COMPLETED | OUTPATIENT
Start: 2025-04-26 | End: 2025-04-26

## 2025-04-26 RX ORDER — IBUPROFEN 600 MG/1
600 TABLET ORAL EVERY 6 HOURS PRN
Qty: 20 TABLET | Refills: 0 | Status: SHIPPED | OUTPATIENT
Start: 2025-04-26

## 2025-04-26 RX ORDER — BUTALBITAL, ACETAMINOPHEN AND CAFFEINE 300; 40; 50 MG/1; MG/1; MG/1
1 CAPSULE ORAL EVERY 8 HOURS PRN
Qty: 14 CAPSULE | Refills: 0 | Status: SHIPPED | OUTPATIENT
Start: 2025-04-26 | End: 2025-05-26

## 2025-04-26 RX ADMIN — PROCHLORPERAZINE EDISYLATE 10 MG: 5 INJECTION INTRAMUSCULAR; INTRAVENOUS at 12:04

## 2025-04-26 RX ADMIN — DIPHENHYDRAMINE HYDROCHLORIDE 25 MG: 50 INJECTION INTRAMUSCULAR; INTRAVENOUS at 12:04

## 2025-04-26 RX ADMIN — DEXAMETHASONE SODIUM PHOSPHATE 8 MG: 4 INJECTION INTRA-ARTICULAR; INTRALESIONAL; INTRAMUSCULAR; INTRAVENOUS; SOFT TISSUE at 02:04

## 2025-04-26 RX ADMIN — KETOROLAC TROMETHAMINE 15 MG: 30 INJECTION, SOLUTION INTRAMUSCULAR; INTRAVENOUS at 12:04

## 2025-04-26 NOTE — ED PROVIDER NOTES
Encounter Date: 2025    SCRIBE #1 NOTE: I, Xiomara Reis, am scribing for, and in the presence of,  Bayron Pearl MD. I have scribed the following portions of the note - Other sections scribed: HPI, ROS.       History     Chief Complaint   Patient presents with    Headache     BIB [facility] for migraine, pt was seen here 3 days ago for the same c/c but reports no relief. No neuro deficits in triage.      This 30 y.o female with a medical history of Anxiety presents to the ED via EMS transportation c/o a headache to the left temporal region radiating into the left scalp. Pt reports that she initially began to experience the pain on 25 and states that she was seen in this ED 3 days ago (25) and given medications for treatment with some improvement. She notes, however, that the pain has since worsened and is persisting. She reports also experiencing associated blurred vision, photophobia, numbness to bilateral hands with decreased  strength, and difficulty sleeping. She states that she took a Tension headache medication today for treatment without any improvement. Pt notes a history of headaches, but reports that they have never been this severe. She states that she is currently very stressed as her child is depressed and recently attempted suicide. She notes that she has 4 children and they are all currently staying with her grandmother to give her a break due to her current symptoms. Pt denies alcohol, tobacco, or drug use. No known medication allergies. There are no other associated symptoms at this time.     The history is provided by the patient.         Review of patient's allergies indicates:  No Known Allergies  Past Medical History:   Diagnosis Date    Anxiety      Past Surgical History:   Procedure Laterality Date     SECTION       SECTION N/A 2018    Procedure:  SECTION;  Surgeon: Khushi Vick MD;  Location: [facility] L&D;  Service: OB/GYN;  Laterality: N/A;      SECTION WITH TUBAL LIGATION N/A 2019    Procedure:  SECTION, WITH TUBAL LIGATION;  Surgeon: Nori Mcmahon MD;  Location: [facility] L&D;  Service: OB/GYN;  Laterality: N/A;    TENDON RELEASE      right 3rd finger    TONSILLECTOMY       Family History   Problem Relation Name Age of Onset    Hypertension Maternal Grandmother      Diabetes Maternal Grandfather       Social History[1]  Review of Systems   Constitutional: Negative.    HENT: Negative.     Eyes:  Positive for photophobia and visual disturbance (blurred vision).   Respiratory: Negative.     Cardiovascular: Negative.    Gastrointestinal: Negative.    Genitourinary: Negative.    Musculoskeletal: Negative.    Skin: Negative.    Neurological:  Positive for numbness (to bilateral hands) and headaches (left temporal radiating into left scalp).   Psychiatric/Behavioral:  Positive for sleep disturbance.        Physical Exam     Initial Vitals [25 2259]   BP Pulse Resp Temp SpO2   (!) 148/86 88 18 98.6 °F (37 °C) 99 %      MAP       --         Physical Exam    Nursing note and vitals reviewed.  Constitutional: She appears well-developed and well-nourished. She is not diaphoretic. She appears distressed (mildly).   HENT:   Head: Normocephalic and atraumatic.   Nose: Nose normal.   Eyes: EOM are normal. Pupils are equal, round, and reactive to light.   Neck: Neck supple. No JVD present.   Normal range of motion.  Cardiovascular:  Normal rate, regular rhythm, normal heart sounds and intact distal pulses.           Pulmonary/Chest: No stridor. No respiratory distress.   Abdominal: Abdomen is soft. Bowel sounds are normal. She exhibits no distension. There is no abdominal tenderness.   Musculoskeletal:         General: No tenderness or edema. Normal range of motion.      Cervical back: Normal range of motion and neck supple.     Neurological: She is alert and oriented to person, place, and time. She has normal strength. No cranial nerve  deficit.   Skin: Skin is warm and dry. Capillary refill takes less than 2 seconds. No rash noted. No erythema.         ED Course   Procedures  Labs Reviewed   CBC WITH DIFFERENTIAL - Abnormal       Result Value    WBC 11.01      RBC 4.44      HGB 13.2      HCT 39.4      MCV 89      MCH 29.7      MCHC 33.5      RDW 12.8      Platelet Count 390      MPV 9.4      Nucleated RBC 0      Neut % 61.9      Lymph % 29.2      Mono % 6.5      Eos % 1.4      Basophil % 0.5      Imm Grans % 0.5      Neut # 6.82      Lymph # 3.21      Mono # 0.72      Eos # 0.15      Baso # 0.05      Imm Grans # 0.06 (*)    COMPREHENSIVE METABOLIC PANEL - Normal    Sodium 141      Potassium 3.8      Chloride 105      CO2 28      Glucose 100      BUN 14      Creatinine 0.8      Calcium 9.0      Protein Total 7.8      Albumin 3.7      Bilirubin Total 0.1      ALP 62      AST 14      ALT 14      Anion Gap 8      eGFR >60     CBC W/ AUTO DIFFERENTIAL    Narrative:     The following orders were created for panel order CBC auto differential.  Procedure                               Abnormality         Status                     ---------                               -----------         ------                     CBC with Differential[[id number]]       Abnormal            Final result                 Please view results for these tests on the individual orders.          Imaging Results              CT Head Without Contrast (Final result)  Result time 04/26/25 01:22:00      Final result by Tomas Diaz MD (04/26/25 01:22:00)                   Impression:      There is no evidence for acute intracranial process.      Electronically signed by: Tomas Diaz  Date:    04/26/2025  Time:    01:22               Narrative:    EXAMINATION:  CT HEAD WITHOUT CONTRAST    CLINICAL HISTORY:  Headache, chronic, new features or increased frequency;    TECHNIQUE:  Low dose axial images were obtained through the head.  Coronal and sagittal reformations were also  performed. Contrast was not administered.    COMPARISON:  None.    FINDINGS:  The ventricular system, sulcal pattern and parenchymal attenuation characteristics appear appropriate for age.  There is no evidence for intracranial mass, mass effect or midline shift and there is no evidence for acute intracranial hemorrhage.  Appropriate CSF spaces are seen at the skull base.    The mastoid air cells appear appropriate when accounting for averaging.  Minimal paranasal sinus mucosal thickening is noted.  The visualized orbits appear intact.  The osseous structures appear intact.                                       Medications   sodium chloride 0.9% bolus 1,000 mL 1,000 mL (1,000 mLs Intravenous Not Given 4/26/25 0000)   ketorolac injection 15 mg (15 mg Intravenous Given 4/26/25 0048)   diphenhydrAMINE injection 25 mg (25 mg Intravenous Given 4/26/25 0048)   prochlorperazine injection Soln 10 mg (10 mg Intravenous Given 4/26/25 0048)   dexAMETHasone injection 8 mg (8 mg Intravenous Given 4/26/25 0210)     Medical Decision Making  Amount and/or Complexity of Data Reviewed  Labs: ordered.  Radiology: ordered.    Risk  Prescription drug management.      MDM:    30-year-old female with past medical history as noted above presenting with headache.  Differential Diagnosis includes:  Complex migraine, tension headache, temporal arteritis, cluster headache, trigeminal neuralgia.  Physical exam as noted above.  ED workup notable for CBC white blood cell count 11.01, hemoglobin 13.2, CMP BUN 14, creatinine 0.8, CT head is unremarkable.  Patient presentation appears more consistent with headache, suspected to be migrainous, resolved with IV fluid, treatment here.  Will continue supportive care going forward.  No neuro deficit noted, vitals are stable.  Do not suspect any additional surgical or medical emergency. Discussed diagnosis and further treatment with patient, including f/u.  Return precautions given and all questions  answered.  Patient in understanding of plan.  Pt discharged to home improved and stable.        Note was created using voice recognition software. Note may have occasional typographical or grammatical errors, garbled syntax, and other bizarre constructions that may not have been identified and edited despite good laurence initial review prior to signing.             Scribe Attestation:   Scribe #1: I performed the above scribed service and the documentation accurately describes the services I performed. I attest to the accuracy of the note.                         I, Bayron Pearl M.D., personally performed the services described in this documentation. All medical record entries made by the scribe were at my direction and in my presence. I have reviewed the chart and agree that the record reflects my personal performance and is accurate and complete.      DISCLAIMER: This note was prepared with bookletmobile voice recognition transcription software. Garbled syntax, mangled pronouns, and other bizarre constructions may be attributed to that software system.       Clinical Impression:  Final diagnoses:  [G43.809] Other migraine without status migrainosus, not intractable (Primary)          ED Disposition Condition    Discharge Good          ED Prescriptions       Medication Sig Dispense Start Date End Date Auth. Provider    ondansetron (ZOFRAN-ODT) 4 MG TbDL Take 1 tablet (4 mg total) by mouth every 8 (eight) hours as needed. 20 tablet 4/26/2025 -- Bayron Pearl MD    ibuprofen (ADVIL,MOTRIN) 600 MG tablet Take 1 tablet (600 mg total) by mouth every 6 (six) hours as needed for Pain. 20 tablet 4/26/2025 -- Bayron Pearl MD    butalbital-acetaminophen-caff -40 mg Cap  (Status: Discontinued) Take 1 capsule by mouth every 8 (eight) hours as needed (headache). 14 capsule 4/26/2025 4/26/2025 Bayron Pearl MD butalbital-acetaminophen-caff -40 mg Cap Take 1 capsule by mouth every 8 (eight)  hours as needed (headache). 14 capsule 4/26/2025 5/26/2025 Bayron Pearl MD          Follow-up Information       Follow up With Specialties Details Why Contact [facility] - Emergency Dept Emergency Medicine Go to  If symptoms worsen [address] [facility] [place] [id number] [phone]    Your Primary Care Physician  Schedule an appointment as soon as possible for a visit in 1 week As needed                  [1]   Social History  Tobacco Use    Smoking status: Never    Smokeless tobacco: Never   Substance Use Topics    Alcohol use: No    Drug use: No        Bayron Pearl MD  04/26/25 0356

## 2025-05-24 ENCOUNTER — HOSPITAL ENCOUNTER (EMERGENCY)
Facility: HOSPITAL | Age: 30
Discharge: HOME | End: 2025-05-24
Attending: STUDENT IN AN ORGANIZED HEALTH CARE EDUCATION/TRAINING PROGRAM
Payer: MEDICAID

## 2025-05-24 VITALS
HEIGHT: 65 IN | OXYGEN SATURATION: 99 % | RESPIRATION RATE: 20 BRPM | TEMPERATURE: 98 F | BODY MASS INDEX: 36.65 KG/M2 | DIASTOLIC BLOOD PRESSURE: 70 MMHG | HEART RATE: 65 BPM | SYSTOLIC BLOOD PRESSURE: 121 MMHG | WEIGHT: 220 LBS

## 2025-05-24 DIAGNOSIS — R51.9 HEADACHE: Primary | ICD-10-CM

## 2025-05-24 LAB
B-HCG UR QL: NEGATIVE
CTP QC/QA: YES
GLUCOSE SERPL-MCNC: 135 MG/DL (ref 70–110)
POCT GLUCOSE: 135 MG/DL (ref 70–110)

## 2025-05-24 PROCEDURE — 99284 EMERGENCY DEPT VISIT MOD MDM: CPT | Mod: 25

## 2025-05-24 PROCEDURE — 82962 GLUCOSE BLOOD TEST: CPT

## 2025-05-24 PROCEDURE — 25000003 PHARM REV CODE 250: Performed by: PHYSICIAN ASSISTANT

## 2025-05-24 PROCEDURE — 63600175 PHARM REV CODE 636 W HCPCS: Mod: JZ,TB | Performed by: PHYSICIAN ASSISTANT

## 2025-05-24 PROCEDURE — 81025 URINE PREGNANCY TEST: CPT | Performed by: PHYSICIAN ASSISTANT

## 2025-05-24 PROCEDURE — 96372 THER/PROPH/DIAG INJ SC/IM: CPT | Performed by: PHYSICIAN ASSISTANT

## 2025-05-24 RX ORDER — ACETAMINOPHEN 500 MG
1000 TABLET ORAL
Status: COMPLETED | OUTPATIENT
Start: 2025-05-24 | End: 2025-05-24

## 2025-05-24 RX ORDER — PROCHLORPERAZINE MALEATE 5 MG
10 TABLET ORAL
Status: COMPLETED | OUTPATIENT
Start: 2025-05-24 | End: 2025-05-24

## 2025-05-24 RX ORDER — ONDANSETRON 4 MG/1
4 TABLET, ORALLY DISINTEGRATING ORAL EVERY 6 HOURS PRN
Qty: 12 TABLET | Refills: 0 | Status: SHIPPED | OUTPATIENT
Start: 2025-05-24

## 2025-05-24 RX ORDER — HYDROXYZINE PAMOATE 50 MG/1
50 CAPSULE ORAL 4 TIMES DAILY PRN
Qty: 20 CAPSULE | Refills: 0 | Status: SHIPPED | OUTPATIENT
Start: 2025-05-24

## 2025-05-24 RX ORDER — DROPERIDOL 2.5 MG/ML
2.5 INJECTION, SOLUTION INTRAMUSCULAR; INTRAVENOUS ONCE
Status: DISCONTINUED | OUTPATIENT
Start: 2025-05-24 | End: 2025-05-24 | Stop reason: HOSPADM

## 2025-05-24 RX ORDER — LORAZEPAM 0.5 MG/1
1 TABLET ORAL
Status: COMPLETED | OUTPATIENT
Start: 2025-05-24 | End: 2025-05-24

## 2025-05-24 RX ORDER — BUTALBITAL, ACETAMINOPHEN AND CAFFEINE 50; 325; 40 MG/1; MG/1; MG/1
1 TABLET ORAL EVERY 4 HOURS PRN
Qty: 20 TABLET | Refills: 0 | Status: SHIPPED | OUTPATIENT
Start: 2025-05-24 | End: 2025-06-23

## 2025-05-24 RX ORDER — BUTALBITAL, ACETAMINOPHEN AND CAFFEINE 50; 325; 40 MG/1; MG/1; MG/1
1 TABLET ORAL EVERY 4 HOURS PRN
Qty: 20 TABLET | Refills: 0 | Status: SHIPPED | OUTPATIENT
Start: 2025-05-24 | End: 2025-05-24

## 2025-05-24 RX ORDER — DIPHENHYDRAMINE HCL 25 MG
25 CAPSULE ORAL
Status: COMPLETED | OUTPATIENT
Start: 2025-05-24 | End: 2025-05-24

## 2025-05-24 RX ORDER — KETOROLAC TROMETHAMINE 30 MG/ML
30 INJECTION, SOLUTION INTRAMUSCULAR; INTRAVENOUS
Status: COMPLETED | OUTPATIENT
Start: 2025-05-24 | End: 2025-05-24

## 2025-05-24 RX ORDER — DEXAMETHASONE SODIUM PHOSPHATE 4 MG/ML
12 INJECTION, SOLUTION INTRA-ARTICULAR; INTRALESIONAL; INTRAMUSCULAR; INTRAVENOUS; SOFT TISSUE
Status: COMPLETED | OUTPATIENT
Start: 2025-05-24 | End: 2025-05-24

## 2025-05-24 RX ADMIN — DIPHENHYDRAMINE HYDROCHLORIDE 25 MG: 25 CAPSULE ORAL at 03:05

## 2025-05-24 RX ADMIN — PROCHLORPERAZINE MALEATE 10 MG: 5 TABLET ORAL at 03:05

## 2025-05-24 RX ADMIN — KETOROLAC TROMETHAMINE 30 MG: 30 INJECTION, SOLUTION INTRAMUSCULAR; INTRAVENOUS at 03:05

## 2025-05-24 RX ADMIN — LORAZEPAM 1 MG: 0.5 TABLET ORAL at 05:05

## 2025-05-24 RX ADMIN — DEXAMETHASONE SODIUM PHOSPHATE 12 MG: 4 INJECTION INTRA-ARTICULAR; INTRALESIONAL; INTRAMUSCULAR; INTRAVENOUS; SOFT TISSUE at 03:05

## 2025-05-24 RX ADMIN — ACETAMINOPHEN 1000 MG: 500 TABLET ORAL at 03:05

## 2025-05-24 NOTE — DISCHARGE INSTRUCTIONS
Fioricet as needed for headache.  Zofran for any nausea.  Make sure you are drinking plenty of fluids if you are not eating as much.  You can try Vistaril as needed for any feelings of anxiety. Be aware, this medication is sedating.  Do not mix with alcohol or any other sedating medications.  Do not drive or operate machinery when taking this medication.     Please follow up and establish care with a local primary care provider using information provided; establish care with a local neurologist using information provided.    Return to this ED if persistent or worsening headache despite current plan, if you begin with vomiting, if you feel lightheaded or feel as if you are going to pass out, if no improvement with current plan, if any other problems occur.

## 2025-05-24 NOTE — ED NOTES
Pt presents with headache that has been on and off for months, Pt has pain to the left side of her head and states that nothing has been helping recently, She said she used to take OTC meds and get relief but lately has not gotten any relief. States noise and lights bother her. She admits to nausea but denies vomiting    LOC: Pt is awake alert and aware of environment, oriented X3 and speaking appropriately  Appearance: Pt is in no acute distress, Pt is well groomed and clean  Skin: skin is warm and dry with normal turgor, mucus membranes are moist and pink, skin is intact with no bruising or breakdown  Muskuloskeletal: Pt moves all extremities well, there is no obvious swelling or deformities noted, pulses are intact.  Respiratory: Airway is open and patent, respirations are spontaneous and even.  Cardiac:  no edema and cap refill is <3sec  Abdomen: soft, non-tender and non-distended  Neuro: Pt follows commands easily and has no obvious deficits

## 2025-05-24 NOTE — ED PROVIDER NOTES
Encounter Date: 2025       History     Chief Complaint   Patient presents with    Migraine     Pt presents w/ intermittent Migraines since 05/10 which is chronic. Pt reports being unable to f/u w/ PCP due to her PCP being in [place] and being unable to mi rx filled due to her having to gett re-approved for Medicaid. Pt NADN in triage.      30-year-old female presents to ED complaining of left-sided frontal and temporal headache x 2w.    Patient admits to long history of frequent headaches, however states headaches has been more severe since her most recent pregnancy.  She states her headaches have become much more frequent over the past 2 months.  Headaches are typically left-sided.  Admits to associated photophobia, phonophobia.  Admits to nausea without emesis.  No history of CVA.  No previous head injury.  No arm or leg weakness, slurred speech, facial droop, unsteady gait, aphasia.  Denies any recent illness.  No cough.  No abdominal pain.  Does admit to decreased appetite and intake since onset of symptoms.  She used to be able to take over-the-counter medications with resolution of symptoms, however headaches has been persistent over the past few months despite over-the-counter medications.  Was recently seen in this ED and discharged with naproxen, denies relief with naproxen.  Did not  the previously prescribed Zofran or Fioricet due to lack of transportation.  Does admit to mild left-sided neck pain, no neck stiffness.  No fever chills myalgias.  No IV drug use.  No urinary complaints.    No antiplatelet or anticoagulation use.    PMH:  Obesity  Anxiety  Frequent HAs  Juvenile arthritis of feet      Review of patient's allergies indicates:  No Known Allergies  Past Medical History:   Diagnosis Date    Anxiety      Past Surgical History:   Procedure Laterality Date     SECTION       SECTION N/A 2018    Procedure:  SECTION;  Surgeon: Khushi Vick MD;  Location: [facility]  L&D;  Service: OB/GYN;  Laterality: N/A;     SECTION WITH TUBAL LIGATION N/A 2019    Procedure:  SECTION, WITH TUBAL LIGATION;  Surgeon: Nori Mcmahon MD;  Location: [facility] L&D;  Service: OB/GYN;  Laterality: N/A;    TENDON RELEASE      right 3rd finger    TONSILLECTOMY       Family History   Problem Relation Name Age of Onset    Hypertension Maternal Grandmother      Diabetes Maternal Grandfather       Social History[1]  Review of Systems   Constitutional:  Positive for appetite change and fatigue. Negative for chills and fever.   Gastrointestinal:  Positive for nausea. Negative for abdominal pain and vomiting.   Genitourinary:  Negative for dysuria, flank pain, frequency and hematuria.   Musculoskeletal:  Negative for myalgias.   Neurological:  Positive for headaches. Negative for seizures, syncope, facial asymmetry, speech difficulty and weakness.       Physical Exam     Initial Vitals [25 0258]   BP Pulse Resp Temp SpO2   135/69 81 20 97.6 °F (36.4 °C) 99 %      MAP       --         Physical Exam    Nursing note and vitals reviewed.  Constitutional: She appears well-developed and well-nourished. She is not diaphoretic. No distress.   HENT:   Head: Normocephalic and atraumatic.   Neck: Neck supple.   Normal range of motion.  Cardiovascular:  Normal rate and regular rhythm.           Pulmonary/Chest: No respiratory distress.   Musculoskeletal:         General: Normal range of motion.      Cervical back: Normal range of motion and neck supple.     Neurological: She is alert and oriented to person, place, and time. GCS score is 15. GCS eye subscore is 4. GCS verbal subscore is 5. GCS motor subscore is 6.   Skin: Skin is warm.   Psychiatric: Thought content normal.   Mildly anxious         ED Course   Procedures  Labs Reviewed   POCT GLUCOSE - Abnormal       Result Value    POCT Glucose 135 (*)    POCT URINE PREGNANCY    POC Preg Test, Ur Negative       Acceptable Yes        EKG Readings: (Independently Interpreted)   Normal sinus rhythm, ventricular rate 65 beats per minute.  Normal NC, normal QT, normal QRS duration.  No right axis deviation.  No convincing ST elevation.  Inverted T-wave lead 3.  No previous for comparison.       Imaging Results    None          Medications   prochlorperazine tablet 10 mg (10 mg Oral Given 5/24/25 0355)   diphenhydrAMINE capsule 25 mg (25 mg Oral Given 5/24/25 0355)   ketorolac injection 30 mg (30 mg Intramuscular Given 5/24/25 0354)   dexAMETHasone injection 12 mg (12 mg Intramuscular Given 5/24/25 0355)   acetaminophen tablet 1,000 mg (1,000 mg Oral Given 5/24/25 0354)   LORazepam tablet 1 mg (1 mg Oral Given 5/24/25 0502)     Medical Decision Making  Differential diagnosis: Headache disorder, migraine, cerebral aneurysm, CVA, subarachnoid hemorrhage    Amount and/or Complexity of Data Reviewed  External Data Reviewed: radiology and notes.  Labs: ordered. Decision-making details documented in ED Course.  Discussion of management or test interpretation with external provider(s): Admits to long hx similar HAs. Despite reported family hx cerebral aneurysm, normal head CT on previous ED visit, no thunderclap quality to headache.  No history of CVA.  No worrisome neurologic complaints.  No focal deficits.  Do not feel need for repeat imaging.     Risk  OTC drugs.  Prescription drug management.               ED Course as of 05/24/25 1947   Sat May 24, 2025   0445 States there was transient improvement of headache, but now with continued throbbing left frontal and temporal discomfort.  We will order EKG for QT interval, will administer droperidol to see if improvement of headache. [SM]   0457 Refused droperidol, given Ativan p.o.. [SM]   0526 Symptoms resolved on repeat exam.  After discussion, she does state that she has establish Medicaid and she will be able to  prescriptions.  Will discharge with Fioricet, Zofran, referral to a local PCP  given she is having trouble getting to [place].  Referral also placed to establish care with neurologist.  Will also write for Vistaril p.r.n., she does have history of anxiety, symptoms did not much improved following anxiolysis in the ED. Given interim return precautions.  Patient comfortable with plan. [SM]      ED Course User Index  [SM] Edison Banks PA-C                           Clinical Impression:  Final diagnoses:  [R51.9] Headache (Primary)          ED Disposition Condition    Discharge Stable          ED Prescriptions       Medication Sig Dispense Start Date End Date Auth. Provider    hydrOXYzine pamoate (VISTARIL) 50 MG Cap Take 1 capsule (50 mg total) by mouth 4 (four) times daily as needed (anxiety). 20 capsule 5/24/2025 -- Edison Banks PA-C    ondansetron (ZOFRAN-ODT) 4 MG TbDL Take 1 tablet (4 mg total) by mouth every 6 (six) hours as needed (Nausea). 12 tablet 5/24/2025 -- Edison Banks PA-C    butalbital-acetaminophen-caffeine -40 mg (FIORICET, ESGIC) -40 mg per tablet  (Status: Discontinued) Take 1 tablet by mouth every 4 (four) hours as needed for Headaches. 20 tablet 5/24/2025 5/24/2025 Edison Banks PA-C    butalbital-acetaminophen-caffeine -40 mg (FIORICET, ESGIC) -40 mg per tablet Take 1 tablet by mouth every 4 (four) hours as needed for Headaches. 20 tablet 5/24/2025 6/23/2025 Edison Banks PA-C          Follow-up Information       Follow up With Specialties Details Why Contact Info    [facility] -  Schedule an appointment as soon as possible for a visit  To establish primary care physician, for reevaluation [address] [place] [address]  [phone]      [facility]  Schedule an appointment as soon as possible for a visit  To establish primary care physician, For reevaluation [address] [facility] [place] [address]  [phone]      [facility] Schedule  an appointment as soon as possible for a visit  To establish primary care physician, For reevaluation [address] [facility] [address]  [phone]      [facility]  Schedule an appointment as soon as possible for a visit  To establish primary care physician, For reevaluation [address] [facility]  [place] [address]  [phone]                     [1]   Social History  Tobacco Use    Smoking status: Never    Smokeless tobacco: Never   Substance Use Topics    Alcohol use: No    Drug use: No        Edison Banks PA-C  05/24/25 1951

## 2025-05-25 ENCOUNTER — NURSE TRIAGE (OUTPATIENT)
Dept: ADMINISTRATIVE | Facility: CLINIC | Age: 30
End: 2025-05-25
Payer: MEDICAID

## 2025-05-25 NOTE — TELEPHONE ENCOUNTER
Reason for Disposition   Caller has already spoken with another triager and has no further questions.    Additional Information   Caller has already spoken to PCP (doctor or NP/PA) or another triager    Protocols used: Information Only Call - No Triage-A-AH, No Contact or Duplicate Contact Call-A-AH  Pt states she missed a call. Warm transferred to Triage nurse Erna.

## 2025-05-25 NOTE — TELEPHONE ENCOUNTER
Pt seen in ED yesterday for a migraine. Prescribed Fioricet. The medication is not at pharmacy. Per chart review, electronic transmission failed. Called and confirmed rx for pharmacist. Pt advised pharmacy is working on getting rx filled now. No further assistance needed.  Reason for Disposition   [1] Prescription prescribed recently is not at pharmacy AND [2] triager has access to patient's EMR AND [3] prescription is recorded in the EMR    Additional Information   Negative: [1] Intentional drug overdose AND [2] suicidal thoughts or ideas   Negative: MORE THAN A DOUBLE DOSE of a prescription or over-the-counter (OTC) drug   Negative: [1] DOUBLE DOSE (an extra dose or lesser amount) of prescription drug AND [2] any symptoms (e.g., dizziness, nausea, pain, sleepiness)   Negative: [1] DOUBLE DOSE (an extra dose or lesser amount) of over-the-counter (OTC) drug AND [2] any symptoms (e.g., dizziness, nausea, pain, sleepiness)   Negative: [1] DOUBLE DOSE (an extra dose or lesser amount) of prescription drug AND [2] NO symptoms  (Exception: A double dose of antibiotics.)   Negative: Took another person's prescription drug   Negative: Diabetes drug error or overdose (e.g., took wrong type of insulin or took extra dose)   Negative: [1] Pharmacy calling with prescription question AND [2] triager unable to answer question   Negative: [1] Prescription not at pharmacy AND [2] was prescribed by PCP recently (Exception: Triager has access to EMR and prescription is recorded there. Go to Home Care and confirm for pharmacy.)   Negative: [1] Caller has URGENT medicine question about med that PCP or specialist prescribed AND [2] triager unable to answer question   Negative: Medicine patch causing local rash or itching   Negative: [1] Caller has medicine question about med NOT prescribed by PCP AND [2] triager unable to answer question (e.g., compatibility with other med, storage)   Negative: Prescription request for new medicine (not  a refill)   Negative: [1] Caller has NON-URGENT medicine question about med that PCP prescribed AND [2] triager unable to answer question   Negative: Caller wants to use a complementary or alternative medicine    Protocols used: Medication Question Call-A-AH

## (undated) DEVICE — DRESSING AQUACEL AG ADV 3.5X12